# Patient Record
Sex: FEMALE | Employment: UNEMPLOYED | URBAN - METROPOLITAN AREA
[De-identification: names, ages, dates, MRNs, and addresses within clinical notes are randomized per-mention and may not be internally consistent; named-entity substitution may affect disease eponyms.]

---

## 2022-06-04 ENCOUNTER — TELEPHONE ENCOUNTER (OUTPATIENT)
Dept: URBAN - METROPOLITAN AREA CLINIC 68 | Facility: CLINIC | Age: 72
End: 2022-06-04

## 2022-06-04 RX ORDER — ANASTROZOLE 1 MG/1
ARIMIDEX( 1MG ORAL  DAILY ) INACTIVE -HX ENTRY TABLET ORAL DAILY
OUTPATIENT
Start: 2019-06-18

## 2022-06-04 RX ORDER — BIOTIN 5 MG
BIOTIN 5000( 5MG ORAL  DAILY ) INACTIVE -HX ENTRY CAPSULE ORAL DAILY
OUTPATIENT
Start: 2019-06-18

## 2022-06-04 RX ORDER — SODIUM SULFATE, POTASSIUM SULFATE, MAGNESIUM SULFATE 17.5; 3.13; 1.6 G/ML; G/ML; G/ML
SOLUTION, CONCENTRATE ORAL AS DIRECTED
Qty: 1 | Refills: 0 | OUTPATIENT
Start: 2019-06-18 | End: 2019-06-19

## 2022-06-04 RX ORDER — POLYETHYLENE GLYCOL 3350, SODIUM SULFATE, SODIUM CHLORIDE, POTASSIUM CHLORIDE, ASCORBIC ACID, SODIUM ASCORBATE 7.5-2.691G
KIT ORAL AS DIRECTED
Qty: 1 | Refills: 0 | OUTPATIENT
Start: 2012-11-29 | End: 2012-11-30

## 2022-06-04 RX ORDER — ASPIRIN 81 MG
DHA COMPLETE( 200MG ORAL  DAILY ) INACTIVE -HX ENTRY TABLET,CHEWABLE ORAL DAILY
OUTPATIENT
Start: 2019-06-18

## 2022-06-05 ENCOUNTER — TELEPHONE ENCOUNTER (OUTPATIENT)
Dept: URBAN - METROPOLITAN AREA CLINIC 68 | Facility: CLINIC | Age: 72
End: 2022-06-05

## 2022-06-05 RX ORDER — METOPROLOL TARTRATE 100 MG/1
METOPROLOL TARTRATE( 100MG ORAL 1 DAILY ) ACTIVE -HX ENTRY TABLET, FILM COATED ORAL DAILY
Status: ACTIVE | COMMUNITY
Start: 2019-06-18

## 2022-06-05 RX ORDER — LEVOTHYROXINE SODIUM 0.05 MG/1
LEVOTHYROXINE SODIUM( 50MCG ORAL 1 DAILY ) ACTIVE -HX ENTRY TABLET ORAL DAILY
Status: ACTIVE | COMMUNITY
Start: 2019-06-18

## 2022-06-25 ENCOUNTER — TELEPHONE ENCOUNTER (OUTPATIENT)
Age: 72
End: 2022-06-25

## 2022-06-25 RX ORDER — POLYETHYLENE GLYCOL 3350, SODIUM SULFATE, SODIUM CHLORIDE, POTASSIUM CHLORIDE, ASCORBIC ACID, SODIUM ASCORBATE 7.5-2.691G
KIT ORAL AS DIRECTED
Qty: 1 | Refills: 0 | OUTPATIENT
Start: 2012-11-29 | End: 2012-11-30

## 2022-06-25 RX ORDER — ASPIRIN 81 MG
DHA COMPLETE( 200MG ORAL  DAILY ) INACTIVE -HX ENTRY TABLET,CHEWABLE ORAL DAILY
OUTPATIENT
Start: 2019-06-18

## 2022-06-25 RX ORDER — SODIUM SULFATE, POTASSIUM SULFATE, MAGNESIUM SULFATE 17.5; 3.13; 1.6 G/ML; G/ML; G/ML
SOLUTION, CONCENTRATE ORAL AS DIRECTED
Qty: 1 | Refills: 0 | OUTPATIENT
Start: 2019-06-18 | End: 2019-06-19

## 2022-06-25 RX ORDER — ANASTROZOLE 1 MG/1
ARIMIDEX( 1MG ORAL  DAILY ) INACTIVE -HX ENTRY TABLET ORAL DAILY
OUTPATIENT
Start: 2019-06-18

## 2022-06-25 RX ORDER — ELECTROLYTES/DEXTROSE
VITAMIN D3( 1000UNIT ORAL  DAILY ) INACTIVE -HX ENTRY SOLUTION, ORAL ORAL DAILY
OUTPATIENT
Start: 2019-06-18

## 2022-06-26 ENCOUNTER — TELEPHONE ENCOUNTER (OUTPATIENT)
Age: 72
End: 2022-06-26

## 2022-06-26 RX ORDER — ASPIRIN 81 MG/1
LOW-DOSE ASPIRIN( 81MG ORAL  DAILY ) ACTIVE -HX ENTRY TABLET, COATED ORAL DAILY
Status: ACTIVE | COMMUNITY
Start: 2019-06-18

## 2022-06-26 RX ORDER — METOPROLOL TARTRATE 100 MG/1
METOPROLOL TARTRATE( 100MG ORAL 1 DAILY ) ACTIVE -HX ENTRY TABLET, FILM COATED ORAL DAILY
Status: ACTIVE | COMMUNITY
Start: 2019-06-18

## 2022-06-26 RX ORDER — LEVOTHYROXINE SODIUM 50 UG/1
LEVOTHYROXINE SODIUM( 50MCG ORAL 1 DAILY ) ACTIVE -HX ENTRY TABLET ORAL DAILY
Status: ACTIVE | COMMUNITY
Start: 2019-06-18

## 2023-08-29 LAB
APPEARANCE, URINE: CLEAR
BILIRUBIN, URINE: NEGATIVE
BLOOD, URINE: NEGATIVE
CALCIUM (MG/DL) IN SER/PLAS: 9.6 MG/DL (ref 8.6–10.6)
CHOLESTEROL (MG/DL) IN SER/PLAS: 169 MG/DL (ref 0–199)
CHOLESTEROL IN HDL (MG/DL) IN SER/PLAS: 51.3 MG/DL
CHOLESTEROL/HDL RATIO: 3.3
COLOR, URINE: YELLOW
GLUCOSE, URINE: NEGATIVE MG/DL
HYALINE CASTS, URINE: ABNORMAL /LPF
KETONES, URINE: NEGATIVE MG/DL
LDL: 91 MG/DL (ref 0–99)
LEUKOCYTE ESTERASE, URINE: NEGATIVE
MAGNESIUM (MG/DL) IN SER/PLAS: 2.12 MG/DL (ref 1.6–2.4)
MUCUS, URINE: ABNORMAL /LPF
NITRITE, URINE: NEGATIVE
PH, URINE: 5 (ref 5–8)
PROTEIN, URINE: NEGATIVE MG/DL
RBC, URINE: 1 /HPF (ref 0–5)
SPECIFIC GRAVITY, URINE: 1.02 (ref 1–1.03)
SQUAMOUS EPITHELIAL CELLS, URINE: <1 /HPF
TRIGLYCERIDE (MG/DL) IN SER/PLAS: 135 MG/DL (ref 0–149)
UROBILINOGEN, URINE: <2 MG/DL (ref 0–1.9)
VLDL: 27 MG/DL (ref 0–40)
WBC, URINE: 1 /HPF (ref 0–5)

## 2023-10-04 ENCOUNTER — TELEPHONE (OUTPATIENT)
Dept: ADMISSION | Facility: HOSPITAL | Age: 73
End: 2023-10-04
Payer: MEDICARE

## 2023-10-04 NOTE — TELEPHONE ENCOUNTER
Pt takes Ibrance; wants to know when Dr. Garber recommends she get her Flu vaccine this year.     1650: Let pt know per Dr Garber she should get the Flu vaccine this month on her off-week of Ibrance. She verbalized understanding. No further questions/concerns.

## 2023-10-24 ENCOUNTER — NURSE TRIAGE (OUTPATIENT)
Dept: ADMISSION | Facility: HOSPITAL | Age: 73
End: 2023-10-24
Payer: MEDICARE

## 2023-10-24 RX ORDER — DULOXETIN HYDROCHLORIDE 60 MG/1
60 CAPSULE, DELAYED RELEASE ORAL DAILY
COMMUNITY
Start: 2023-07-26

## 2023-10-24 RX ORDER — DULOXETIN HYDROCHLORIDE 30 MG/1
30 CAPSULE, DELAYED RELEASE ORAL DAILY
Qty: 90 CAPSULE | Status: CANCELLED | OUTPATIENT
Start: 2023-10-24

## 2023-10-24 RX ORDER — DULOXETIN HYDROCHLORIDE 60 MG/1
60 CAPSULE, DELAYED RELEASE ORAL DAILY
Qty: 90 CAPSULE | Status: CANCELLED | OUTPATIENT
Start: 2023-10-24

## 2023-10-24 RX ORDER — DULOXETIN HYDROCHLORIDE 30 MG/1
30 CAPSULE, DELAYED RELEASE ORAL DAILY
COMMUNITY
Start: 2023-07-26

## 2023-10-24 NOTE — TELEPHONE ENCOUNTER
Called patient to discuss refills. She states she is still in [place] but will be back in [place] to see Dr. Garber on 11/10 unfortunately Diana is at [facility] but this appt is at [facility] so unable to align. She reports she is doing relatively well except for some n/t in her hand. She is using docusate which controls her constipation well. She was agreeable to calling her PCP that is in [place] to fill this since Diana is unable to prescribe across state lines. Dr. Ye Galvan. We will set up a FUV in February when she said she will be back again for another oncology visit.

## 2023-11-02 ENCOUNTER — TELEPHONE (OUTPATIENT)
Dept: ADMISSION | Facility: HOSPITAL | Age: 73
End: 2023-11-02
Payer: MEDICARE

## 2023-11-02 DIAGNOSIS — D49.3 BREAST NEOPLASM: Primary | ICD-10-CM

## 2023-11-02 NOTE — TELEPHONE ENCOUNTER
Gracy called triage line and wanted to get and appointment to see Diana Farias to go over medications and other questions. I will put in a request with scheduling now.

## 2023-11-08 PROBLEM — C78.02: Status: ACTIVE | Noted: 2023-11-08

## 2023-11-08 PROBLEM — C50.919 MALIGNANT NEOPLASM OF UNSPECIFIED SITE OF UNSPECIFIED FEMALE BREAST (MULTI): Status: ACTIVE | Noted: 2023-11-08

## 2023-11-08 PROBLEM — C50.912: Status: ACTIVE | Noted: 2023-11-08

## 2023-11-08 RX ORDER — DIPHENHYDRAMINE HYDROCHLORIDE 50 MG/ML
50 INJECTION INTRAMUSCULAR; INTRAVENOUS AS NEEDED
Status: CANCELLED | OUTPATIENT
Start: 2023-11-10

## 2023-11-08 RX ORDER — EPINEPHRINE 0.3 MG/.3ML
0.3 INJECTION SUBCUTANEOUS EVERY 5 MIN PRN
Status: CANCELLED | OUTPATIENT
Start: 2023-11-10

## 2023-11-08 RX ORDER — FAMOTIDINE 10 MG/ML
20 INJECTION INTRAVENOUS ONCE AS NEEDED
Status: CANCELLED | OUTPATIENT
Start: 2023-11-10

## 2023-11-08 RX ORDER — ZOLEDRONIC ACID 0.04 MG/ML
4 INJECTION, SOLUTION INTRAVENOUS ONCE
Status: CANCELLED | OUTPATIENT
Start: 2023-11-10

## 2023-11-08 RX ORDER — ALBUTEROL SULFATE 0.83 MG/ML
3 SOLUTION RESPIRATORY (INHALATION) AS NEEDED
Status: CANCELLED | OUTPATIENT
Start: 2023-11-10

## 2023-11-08 NOTE — PROGRESS NOTES
Patient ID: Gracy Espino is a 73 y.o. female.    The patient presents to clinic today for her history of breast cancer.     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  ER >95% KY 10% Her2-negative (0 IHC).  Sites of Disease: LN's, pleura, bone.  NGS: PIK3CA     -2007: Right T1c N1a ER/KY+ Her2- breast cancer.  -TAC x6 cycles.  -RT completed 3/2008.  -Completed 5 years of anastrozole 4/2013. Two more years, stopped 6/2015.  -1/7/2023: Presented to ER with dyspnea and right lymphedema. CTA showed large right-sided pleural effusion, small left pleural effusion, nodular opacities in MARITZA and lingual, right axillary soft tissue lesion (3.7 x 4.1 cm) with nodular opacities in  subcutaneous tissues of right chest wall.  -1/13/23: PET/CT showed hypermetabolic disease in multiple supraclavicular, axillary, mediastinal, hilar, periaortic LN’s. Osseous lesions in axial and appendicular skeleton, pleural nodularities in right hemithorax, focus of activity in right  posterior triceps muscle.  -1/13/23: RUE MRI confirmed right masslike axillary LAD with mass effect on the neurovascular bundle without occlusion.  -1/20/23: Right axillary biopsy confirmed metastatic IDC, ER >95% KY 10% Her2-negative (0 by IHC). NGS testing showed PIK3CA  mutation.  -1/2023: Letrozole initiated.  -2/7/23: Palbociclib initiated.  -5/11/23: Zoledronic acid initiated (q12 weeks).       History of Present Illness (HPI)/Interval History:  Ms. Espino presents for presents today for follow-up, treatment and surveillance. Today she is in her 3rd week of palbociclib (3 days left). Continues to tolerate treatment well. Her biggest complaint  is ongoing lymphedema and cramping in right hand and feet. She is a a small lymphedema sleeve now. Taking duloxetine 90 mg for neuropathy. She does follow with supportive oncology.     She fell a week and a half ago, required 3 stiches. Tripped over a cord. She is using arnicare for bruising.      She endorses her breathing is stable to improved. Denies nausea, vomiting, constipation, diarrhea, mouth sores, or new bone pain.     Review of Systems:  14-point ROS otherwise negative, as per HPI.    No past medical history on file.    No past surgical history on file.    Social History     Socioeconomic History    Marital status:      Spouse name: Not on file    Number of children: Not on file    Years of education: Not on file    Highest education level: Not on file   Occupational History    Not on file   Tobacco Use    Smoking status: Not on file    Smokeless tobacco: Not on file   Substance and Sexual Activity    Alcohol use: Not on file    Drug use: Not on file    Sexual activity: Not on file   Other Topics Concern    Not on file   Social History Narrative    Not on file     Social Determinants of Health     Financial Resource Strain: Not on file   Food Insecurity: Not on file   Transportation Needs: Not on file   Physical Activity: Not on file   Stress: Not on file   Social Connections: Not on file   Intimate Partner Violence: Not on file   Housing Stability: Not on file       Allergies   Allergen Reactions    Penicillins Unknown         Current Outpatient Medications:     docusate sodium (Colace) 100 mg capsule, Take 2 capsules (200 mg) by mouth once daily at bedtime., Disp: , Rfl:     DULoxetine (Cymbalta) 30 mg DR capsule, Take 1 capsule (30 mg) by mouth once daily., Disp: , Rfl:     DULoxetine (Cymbalta) 60 mg DR capsule, Take 1 capsule (60 mg) by mouth once daily., Disp: , Rfl:     levothyroxine (Synthroid, Levoxyl) 50 mcg tablet, Take by mouth., Disp: , Rfl:     metoprolol succinate XL (Toprol-XL) 100  "mg 24 hr tablet, Take 1 tablet (100 mg) by mouth once daily., Disp: , Rfl:     spironolacton-hydrochlorothiaz (Aldactazide) 25-25 mg tablet, , Disp: , Rfl:     traMADol (Ultram) 50 mg tablet, , Disp: , Rfl:     ALPRAZolam (Xanax) 0.25 mg tablet, TAKE 1 TABLET BY MOUTH UP TO EVERY 8 HOURS AS NEEDED FOR ANXIETY, Disp: , Rfl:     amLODIPine (Norvasc) 2.5 mg tablet, Take by mouth., Disp: , Rfl:     cholecalciferol (Vitamin D3) 50 MCG (2000 UT) tablet, Take 1 tablet (50 mcg) by mouth once daily., Disp: , Rfl:     clindamycin (Cleocin) 300 mg capsule, Take by mouth., Disp: , Rfl:     furosemide (Lasix) 20 mg tablet, Take 1 tablet (20 mg) by mouth once daily., Disp: , Rfl:     letrozole (Femara) 2.5 mg tablet, Take 1 tablet (2.5 mg total) by mouth once daily., Disp: 90 tablet, Rfl: 1    LORazepam (Ativan) 0.5 mg tablet, TAKE 1 TABLET BY MOUTH 45 MINUTES PRIOR TO MRI. DO NOT DRIVE, Disp: , Rfl:     neomycin-polymyxin-dexAMETHasone (Maxitrol) 3.5mg/mL-10,000 unit/mL-0.1 % ophthalmic suspension, , Disp: , Rfl:     omega 3-dha-epa-fish oil (Fish OiL) 1,000 mg (120 mg-180 mg) capsule, Take by mouth., Disp: , Rfl:     ondansetron (Zofran) 8 mg tablet, , Disp: , Rfl:     potassium chloride CR 20 mEq ER tablet, Take 1 tablet (20 mEq) by mouth once daily., Disp: , Rfl:   No current facility-administered medications for this visit.    Facility-Administered Medications Ordered in Other Visits:     alteplase (Cathflo Activase) injection 2 mg, 2 mg, intra-catheter, PRN, EAN Mcfarlane-BERNARDO    heparin flush 10 unit/mL syringe 50 Units, 50 Units, intra-catheter, PRN, EAN Mcfarlane-BERNARDO    heparin flush 100 unit/mL injection 500 Units, 500 Units, intra-catheter, PRN, EAN Mcfarlane-BERNARDO     Objective    BSA: 1.9 meters squared  /67   Pulse 71   Temp 36 °C (96.8 °F) (Skin)   Resp 20   Ht 1.767 m (5' 9.57\")   Wt 73.2 kg (161 lb 6 oz)   SpO2 98%   BMI 23.44 kg/m²     Performance Status:  The ECOG performance scale " today is ECO- Restricted in physically strenuous activity.  Carries out light duty.    Physical Exam  Vitals reviewed.   Constitutional:       General: She is awake. She is not in acute distress.     Appearance: Normal appearance. She is not ill-appearing.   HENT:      Mouth/Throat:      Pharynx: Oropharynx is clear. No oropharyngeal exudate.   Eyes:      General: No scleral icterus.     Conjunctiva/sclera: Conjunctivae normal.   Neck:      Trachea: Trachea and phonation normal. No tracheal tenderness.   Cardiovascular:      Rate and Rhythm: Normal rate and regular rhythm.      Heart sounds: No murmur heard.     No friction rub. No gallop.   Pulmonary:      Effort: Pulmonary effort is normal. No respiratory distress.      Breath sounds: Normal breath sounds. No stridor. No wheezing, rhonchi or rales.   Chest:      Chest wall: No mass, lacerations, deformity or tenderness.   Breasts:     Right: No swelling, mass, nipple discharge, skin change or tenderness.      Left: No swelling, mass, nipple discharge, skin change or tenderness.      Comments: S.p right mastectomy with reconstruction  Abdominal:      General: Abdomen is flat. There is no distension.      Palpations: Abdomen is soft. There is no mass.      Tenderness: There is no abdominal tenderness.   Musculoskeletal:      Right shoulder: Decreased range of motion.      Cervical back: No tenderness.      Thoracic back: No tenderness.      Lumbar back: No tenderness.      Comments: Right arm lymphedema, with sleeve on    Lymphadenopathy:      Cervical: No cervical adenopathy.      Upper Body:      Right upper body: No supraclavicular, axillary or pectoral adenopathy.      Left upper body: No supraclavicular, axillary or pectoral adenopathy.   Skin:     General: Skin is warm and dry.      Coloration: Skin is not jaundiced.      Findings: No lesion or rash.   Neurological:      General: No focal deficit present.      Mental Status: She is alert and oriented to  person, place, and time.      Motor: No weakness.      Gait: Gait normal.   Psychiatric:         Mood and Affect: Mood normal.         Thought Content: Thought content normal.         Judgment: Judgment normal.         Laboratory Data:  Lab Results   Component Value Date    WBC 3.4 (L) 11/10/2023    HGB 12.6 11/10/2023    HCT 37.0 11/10/2023     (H) 11/10/2023     (L) 11/10/2023       Chemistry    Lab Results   Component Value Date/Time     11/10/2023 0930    K 4.3 11/10/2023 0930     11/10/2023 0930    CO2 29 11/10/2023 0930    BUN 18 11/10/2023 0930    CREATININE 0.81 11/10/2023 0930    Lab Results   Component Value Date/Time    CALCIUM 9.4 11/10/2023 0930    ALKPHOS 76 11/10/2023 0930    AST 17 11/10/2023 0930    ALT 17 11/10/2023 0930    BILITOT 0.5 11/10/2023 0930             Radiology:  CT chest abdomen pelvis w IV contrast  Narrative: Interpreted By:  JOANIE CAPUTO MD and RAMSEY SOLANO MD  MRN: [id number]  Patient Name: CHIO VERONICA     STUDY:  CT CHEST ABDOMEN PELVIS W IV CONTRAST;  7/28/2023 11:03 am     INDICATION:  Breast cancer restaging, Pt. is unable to bring her rt. arm above her  head.     Clinical notes: Patient has a history of ER TN positive HER2 negative  breast cancer which was diagnosed originally in 2007, underwent  chemoradiation and hormonal therapy until 2015. Presented in January 2023 with right lymphedema, was found to have a large right pleural  effusion, nodular opacities throughout the lungs  Supraclavicular lymphadenopathy common subcutaneous nodules. Biopsy  of right axillary lymph node confirmed metastatic IDC. Now with  metastasis to the bone. Started on letrozole and palbociclib.     COMPARISON:  Whole-body bone scan 07/28/2023, CT chest abdomen pelvis 05/10/2023,  PET-CT 01/13/2023, chest CT 08/07/2008, CT abdomen pelvis 09/27/2008,  chest CTA 01/07/2020     ACCESSION NUMBER(S):  [id number]     ORDERING CLINICIAN:  ABIMAEL HORVATH      TECHNIQUE:  Contiguous axial images of the chest , abdomen, and pelvis were  obtained  75 milliliter of OMNIPAQUE 350.  Coronal and sagittal  reformatted images were reconstructed from the axial data.     FINDINGS:  CT CHEST:     MEDIASTINUM AND LYMPH NODES: No evidence of left axillary  lymphadenopathy. Compared to prior CT on 05/10/2023, there has been  no significant interval change in the size multiple subcentimeter  mediastinal lymph nodes, largest measuring up to 0.7 centimeters in  station 4L. There is a 0.8 centimeter right hilar lymph node,  unchanged from prior. No new lymph nodes identified. The soft tissue  thickening in the anterior mediastinum anterior to the right  subclavian vein is also unchanged from prior. There was evidence of  associated hypermetabolic activity on prior PET-CT on 01/13/2023.     VESSELS: Normal caliber aorta. Mild aortic atherosclerosis.     HEART:Normal size.Mild coronary artery calcifications. No significant  pericardial effusion.     LUNG, AIRWAYS, PLEURA: The trachea and central bronchi are patent.  Compared to prior examination on 05/10/2023, there has been slight  interval decrease in a moderate right pleural effusion with  associated compressive atelectasis. No left-sided pleural effusion.  Subpleural reticular opacities in the right upper lobe are similar to  prior and are most consistent with post radiation changes. A focal  opacity is also noted in the apical segment of the right upper lobe  (series 202 image 59), which is unchanged dating back to 05/10/2023.  Additional linear opacity in the left lower lobe extending to the  posteromedial pleural surface is again noted, overall decreased  compared to prior CT on 01/07/2023, likely representing residual  atelectasis.     CHEST WALL SOFT TISSUES:There is a 2.8 x 6.2 x 5.3 cm ill-defined  soft tissue mass in the right chest wall at the site of biopsy-proven  metastatic disease, not significantly changed compared to  prior  examination on 05/10/2023. There is an additional 1.1 cm soft tissue  nodule in the right chest wall (series 201, image 71), unchanged from  prior.  Postsurgical changes of bilateral mastectomy with subpectoral  implants. No evidence of soft tissue nodularity is identified at the  postsurgical bed.  Subcutaneous edema involving the right upper extremity soft tissues  is unchanged to minimally decreased from prior, consistent with known  history of lymphedema.     OSSEOUS STRUCTURES:No acute osseous abnormality. Multiple bilateral  sclerotic lesions throughout the ribs are unchanged compared to prior  examination on 05/10/2023. These demonstrated hypermetabolic activity  on prior PET-CT on 01/13/2023, most consistent with osseous  metastases. These lesions are annotated on PACS series 201.  Postsurgical changes of right total shoulder arthroplasty. Atrophy of  the right rotator cuff muscles is suggestive of chronic rotator cuff  tear.        CT ABDOMEN/PELVIS:        ABDOMINAL WALL: Small fat containing periumbilical hernia.     LIVER: Numerous hypodense lesions throughout the liver are again  noted, unchanged in size and distribution compared to prior CT on  05/10/2023, too small to characterize, but likely representing simple  cysts.  There is a peripherally calcified 1.4 centimeter cyst with simple  fluid attenuation arising from hepatic segment 6, significantly  decreased in size compared to prior examination on 02/04/2008, likely  representing a peripherally calcified simple cysts.     BILE DUCTS: Mild prominence of the CBD, likely physiologic post  cholecystectomy. No intrahepatic biliary dilatation.     GALLBLADDER: Surgically absent.     SPLEEN: Stable and normal in size. No focal lesions.     PANCREAS: No significant abnormality.     ADRENALS: No significant abnormality.     KIDNEYS, URETERS, BLADDER: Bilateral simple renal cysts again noted.  There is a 1.7 centimeter exophytic cyst arising from the  interpolar  region of the left kidney with density of 27 Hounsfield units. This  is unchanged compared to prior examination on 01/07/2023 and  previously demonstrated fluid density. Likely represents a cyst  containing hemorrhagic contents. No nephroureterolithiasis or  hydroureteronephrosis. Evaluation of the bladder is limited due to  streak artifact from right total hip arthroplasty.     REPRODUCTIVE ORGANS: Evaluation of the pelvis is limited due to  streak artifact. The uterus is present. No adnexal lesions identified.     VESSELS: Severe vascular calcified and noncalcified noted involving  the aorta and its branches. No aneurysm.  Narrow angle between the SMA branch and the aorta, with mild post  stenotic dilation of the left renal vein, a finding which can be seen  in the setting of nutcracker syndrome. There is also evidence of  poststenotic dilation of the left gonadal vein.     RETROPERITONEUM/LYMPH NODES: No evidence of abdominopelvic  lymphadenopathy by CT criteria.     BOWEL/MESENTERY/PERITONEUM: No significant abnormalities involving  the stomach.Severe colonic diverticulosis without evidence of acute  diverticulitis. The ascending colon is fluid-filled and mildly  hyperemic with suggestion of mild wall thickening (series 206, image  35). Otherwise, no focal bowel wall thickening is identified. The  appendix is normal.     No ascites, free air, or fluid collection.     MUSCULOSKELETAL: No acute osseous abnormality. Diffuse  demineralization of the osseous structures. Multilevel anterior  bridging osteophytes throughout the lower thoracic spine, which can  be seen the setting of diffuse idiopathic skeletal hyperostosis  (DISH).  Numerous sclerotic lesions are noted throughout the sacrum, bilateral  iliac bones, and the left ischium previously demonstrating  hypermetabolic activity on PET-CT dated 01/13/2023, most consistent  with osseous metastatic disease. No new lesions identified. These  lesions are  annotated on PACS series 201. A predominantly lucent  lesion with sclerotic borders is also noted in the right iliac bone  adjacent to the SI joint which is similar to prior examination  (series 201, image 166).  Post laminectomy changes are noted at L3-4. Moderate degenerative  changes of the lumbar spine noted. Postsurgical changes of right  total hip arthroplasty noted without evidence of hardware failure.     Impression: Breast cancer restaging scan.  CHEST  1. Compared to prior examination on 05/10/2023, there has been no  significant interval change in the size of ill-defined soft tissue  lesion in the right chest wall at the site of biopsy-proven  malignancy involving fibrous tissue in the right axilla. Additional  small soft tissue nodule within the right chest wall is also  unchanged from prior.  2. Slight interval decrease in a moderate right pleural effusion with  associated compressive atelectasis in the right lung. A focus of  subsolid opacity is noted in the apical segment of the right upper  lobe which is overall similar to prior examination and may represent  persistent atelectasis. However, continued attention on follow-up  examinations is recommended to ensure resolution.  3. There is no significant interval change in the size of multiple  subcentimeter mediastinal and hilar lymph nodes previously  demonstrating hypermetabolic activity. No evidence of new or  worsening lymphadenopathy identified.  4. No significant interval change in the numerous sclerotic lesions  involving there is bilaterally at the site of known osseous  metastases.  1. Compared to prior examination on 05/10/2023, there has been no  significant interval change in the osseous metastatic foci  predominantly involving the pelvic bones, previously demonstrating  hypermetabolic activity on prior PET-CT. Otherwise, no definitive  evidence of metastatic disease identified in the abdomen or pelvis.  2. The ascending colon is  fluid-filled with suggestion of mild  hyperemia and mild bowel wall thickening at the hepatic flexure. The  findings are nonspecific and may be secondary to bowel wall  underdistention, however, underlying colitis can not be excluded.  Recommend correlation with clinical symptoms.  3.  Additional findings as above.     I personally reviewed the images/study and I agree with the findings  as stated. This study was interpreted at [facility], [place], [place].       No results found for this or any previous visit from the past 365 days.          Assessment/Plan:    Gracy Espino is a 73 y.o. female with a history of metastatic breast cancer, who presents today follow-up evaluation.    Assess/Plan SmartLinks:   Problem List Items Addressed This Visit             ICD-10-CM    Malignant neoplasm of unspecified site of unspecified female breast (CMS/HCC) C50.919    Relevant Medications    letrozole (Femara) 2.5 mg tablet    Other Relevant Orders    CBC and Auto Differential (Completed)    Comprehensive Metabolic Panel (Completed)    Cancer Antigen 27-29 (Completed)    NM bone whole body    Clinic Appointment Request ABIMAEL HORVATH    CBC and Auto Differential    Comprehensive metabolic panel    Cancer Antigen 27-29    Magnesium    Phosphorus    CT chest abdomen pelvis w IV contrast    Carcinoma of left breast metastatic to lung (CMS/HCC) - Primary C50.912, C78.02     - Continue Ibrance + letrozole (will work on [employer] mark renewal) Letrozole refill sent  - Next re-staging scans will be for Feb 2024         Relevant Orders    NM bone whole body    CT chest abdomen pelvis w IV contrast    Decreased ROM of right shoulder M25.611    Carcinoma of breast metastatic to bone (CMS/HCC) C50.919, C79.51     - Continue zometa q 12 weeks, dose today   - Educated on Claritin for flu-like symptoms          Peripheral neuropathy G62.9    Lymphedema of right arm I89.0     Compression of neurovascular  structures without overt brachial plexopathy.  - Systemic therapy, as above. Markedly improved.  - Continue compression sleeve and home massage   - For muscle cramping she can try heat / massage of the hands and feet. Electrolytes WNL              Disposition.  - RTC 3 months with scan review and Dr. Sloan Garber MD follow up  - She was given our contact information and instructed to call with concerns/questions in the interim.    Orders Placed This Encounter   Procedures    NM bone whole body     Standing Status:   Future     Standing Expiration Date:   11/10/2024     Order Specific Question:   Reason for exam:     Answer:   metastatic breast cancer     Order Specific Question:   Radiologist to Determine Optimal Study     Answer:   Yes     Order Specific Question:   Release result to Explore.To [employer] Pages[employer]     Answer:   Immediate [1]     Order Specific Question:   Is this exam part of a Research Study? If Yes, link this order to the research study     Answer:   No    CT chest abdomen pelvis w IV contrast     Standing Status:   Future     Standing Expiration Date:   11/10/2024     Order Specific Question:   Reason for exam:     Answer:   metastatic breast cancer     Order Specific Question:   Radiologist to Determine Optimal Study     Answer:   Yes     Order Specific Question:   Release result to Explore.To [employer]     Answer:   Immediate [1]     Order Specific Question:   Is this exam part of a Research Study? If Yes, link this order to the research study     Answer:   No    CBC and Auto Differential     Standing Status:   Future     Number of Occurrences:   1     Standing Expiration Date:   11/2/2024    Comprehensive Metabolic Panel     Standing Status:   Future     Number of Occurrences:   1     Standing Expiration Date:   11/2/2024     Order Specific Question:   Release result to MyChart     Answer:   Immediate [1]    Cancer Antigen 27-29     Standing Status:   Future     Number of Occurrences:   1     Standing Expiration Date:    11/2/2024     Order Specific Question:   Release result to MyChart     Answer:   Immediate [1]    CBC and Auto Differential     Standing Status:   Future     Standing Expiration Date:   11/10/2024     Order Specific Question:   Release result to MyChart     Answer:   Immediate [1]    Comprehensive metabolic panel     Standing Status:   Future     Standing Expiration Date:   11/10/2024     Order Specific Question:   Release result to MyChart     Answer:   Immediate [1]    Cancer Antigen 27-29     Standing Status:   Future     Standing Expiration Date:   11/10/2024     Order Specific Question:   Release result to MyChart     Answer:   Immediate    Magnesium     Standing Status:   Future     Standing Expiration Date:   11/10/2024     Order Specific Question:   Release result to MyChart     Answer:   Immediate [1]    Phosphorus     Standing Status:   Future     Standing Expiration Date:   11/10/2024     Order Specific Question:   Release result to MyChart     Answer:   Immediate [1]             Allyson Lozano PA-C  Hematology and Medical Oncology  [facility]

## 2023-11-09 PROBLEM — M48.061 LUMBAR STENOSIS: Status: ACTIVE | Noted: 2023-11-09

## 2023-11-09 PROBLEM — M25.611 DECREASED ROM OF RIGHT SHOULDER: Status: ACTIVE | Noted: 2023-11-09

## 2023-11-09 PROBLEM — G62.9 PERIPHERAL NEUROPATHY: Status: ACTIVE | Noted: 2023-11-09

## 2023-11-09 PROBLEM — F41.9 ANXIETY DISORDER: Status: ACTIVE | Noted: 2023-11-09

## 2023-11-09 PROBLEM — M16.11 ARTHRITIS OF RIGHT HIP: Status: ACTIVE | Noted: 2023-11-09

## 2023-11-09 PROBLEM — K59.00 CONSTIPATION: Status: ACTIVE | Noted: 2023-11-09

## 2023-11-09 PROBLEM — Z85.3 HISTORY OF BREAST CANCER: Status: ACTIVE | Noted: 2023-11-09

## 2023-11-09 PROBLEM — G47.00 INSOMNIA: Status: ACTIVE | Noted: 2023-11-09

## 2023-11-09 PROBLEM — R22.30 AXILLARY MASS: Status: ACTIVE | Noted: 2023-11-09

## 2023-11-09 PROBLEM — C50.919 CARCINOMA OF BREAST METASTATIC TO BONE (MULTI): Status: ACTIVE | Noted: 2023-11-09

## 2023-11-09 PROBLEM — M54.16 LUMBAR RADICULOPATHY: Status: ACTIVE | Noted: 2023-11-09

## 2023-11-09 PROBLEM — H00.014 HORDEOLUM EXTERNUM OF LEFT UPPER EYELID: Status: ACTIVE | Noted: 2023-11-09

## 2023-11-09 PROBLEM — M12.811 ROTATOR CUFF ARTHROPATHY OF RIGHT SHOULDER: Status: ACTIVE | Noted: 2023-11-09

## 2023-11-09 PROBLEM — H57.89 SWOLLEN EYE: Status: ACTIVE | Noted: 2023-11-09

## 2023-11-09 PROBLEM — L03.113 CELLULITIS OF RIGHT UPPER ARM: Status: ACTIVE | Noted: 2023-11-09

## 2023-11-09 PROBLEM — R06.02 SHORTNESS OF BREATH ON EXERTION: Status: ACTIVE | Noted: 2023-11-09

## 2023-11-09 PROBLEM — Z96.611 STATUS POST REPLACEMENT OF RIGHT SHOULDER JOINT: Status: ACTIVE | Noted: 2023-11-09

## 2023-11-09 PROBLEM — J90 BILATERAL PLEURAL EFFUSION: Status: ACTIVE | Noted: 2023-11-09

## 2023-11-09 PROBLEM — C79.51 CARCINOMA OF BREAST METASTATIC TO BONE (MULTI): Status: ACTIVE | Noted: 2023-11-09

## 2023-11-09 PROBLEM — I89.0 LYMPHEDEMA OF RIGHT ARM: Status: ACTIVE | Noted: 2023-11-09

## 2023-11-09 RX ORDER — GABAPENTIN 100 MG/1
CAPSULE ORAL
COMMUNITY
Start: 2022-11-11 | End: 2023-11-10 | Stop reason: ALTCHOICE

## 2023-11-09 RX ORDER — TRAMADOL HYDROCHLORIDE 50 MG/1
TABLET ORAL
COMMUNITY
Start: 2023-10-09 | End: 2024-05-28 | Stop reason: SDUPTHER

## 2023-11-09 RX ORDER — ALPRAZOLAM 0.25 MG/1
TABLET ORAL
COMMUNITY
Start: 2023-01-16 | End: 2024-02-07 | Stop reason: ALTCHOICE

## 2023-11-09 RX ORDER — NEOMYCIN SULFATE, POLYMYXIN B SULFATE AND DEXAMETHASONE 3.5; 10000; 1 MG/ML; [USP'U]/ML; MG/ML
SUSPENSION/ DROPS OPHTHALMIC
COMMUNITY
Start: 2023-02-17

## 2023-11-09 RX ORDER — CLINDAMYCIN HYDROCHLORIDE 300 MG/1
CAPSULE ORAL
COMMUNITY
Start: 2022-08-05

## 2023-11-09 RX ORDER — LEVOTHYROXINE SODIUM 50 UG/1
TABLET ORAL
COMMUNITY
Start: 2018-11-26

## 2023-11-09 RX ORDER — AMLODIPINE BESYLATE 2.5 MG/1
TABLET ORAL
COMMUNITY
Start: 2019-06-28

## 2023-11-09 RX ORDER — METOPROLOL SUCCINATE 100 MG/1
100 TABLET, EXTENDED RELEASE ORAL DAILY
COMMUNITY

## 2023-11-09 RX ORDER — FUROSEMIDE 20 MG/1
20 TABLET ORAL DAILY
COMMUNITY
Start: 2023-01-26

## 2023-11-09 RX ORDER — POTASSIUM CHLORIDE 1500 MG/1
20 TABLET, EXTENDED RELEASE ORAL DAILY
COMMUNITY
Start: 2023-01-26

## 2023-11-09 RX ORDER — LORAZEPAM 0.5 MG/1
TABLET ORAL
COMMUNITY
Start: 2023-01-10 | End: 2024-02-07 | Stop reason: ALTCHOICE

## 2023-11-09 RX ORDER — DOCUSATE SODIUM 100 MG/1
200 CAPSULE, LIQUID FILLED ORAL NIGHTLY
COMMUNITY
Start: 2023-09-05

## 2023-11-09 RX ORDER — SPIRONOLACTONE AND HYDROCHLOROTHIAZIDE 25; 25 MG/1; MG/1
TABLET ORAL
COMMUNITY
Start: 2023-10-16

## 2023-11-09 RX ORDER — ONDANSETRON HYDROCHLORIDE 8 MG/1
TABLET, FILM COATED ORAL
COMMUNITY
Start: 2023-04-04

## 2023-11-09 RX ORDER — LETROZOLE 2.5 MG/1
2.5 TABLET, FILM COATED ORAL DAILY
COMMUNITY
Start: 2023-07-31 | End: 2023-11-10 | Stop reason: SDUPTHER

## 2023-11-10 ENCOUNTER — LAB (OUTPATIENT)
Dept: LAB | Facility: HOSPITAL | Age: 73
End: 2023-11-10
Payer: MEDICARE

## 2023-11-10 ENCOUNTER — INFUSION (OUTPATIENT)
Dept: HEMATOLOGY/ONCOLOGY | Facility: HOSPITAL | Age: 73
End: 2023-11-10
Payer: MEDICARE

## 2023-11-10 ENCOUNTER — OFFICE VISIT (OUTPATIENT)
Dept: HEMATOLOGY/ONCOLOGY | Facility: HOSPITAL | Age: 73
End: 2023-11-10
Payer: MEDICARE

## 2023-11-10 VITALS
SYSTOLIC BLOOD PRESSURE: 112 MMHG | DIASTOLIC BLOOD PRESSURE: 67 MMHG | TEMPERATURE: 96.8 F | OXYGEN SATURATION: 98 % | RESPIRATION RATE: 20 BRPM | HEART RATE: 71 BPM | HEIGHT: 70 IN | BODY MASS INDEX: 23.1 KG/M2 | WEIGHT: 161.38 LBS

## 2023-11-10 DIAGNOSIS — C50.912 CARCINOMA OF LEFT BREAST METASTATIC TO LUNG (MULTI): Primary | ICD-10-CM

## 2023-11-10 DIAGNOSIS — C50.919 MALIGNANT NEOPLASM OF BREAST IN FEMALE, ESTROGEN RECEPTOR POSITIVE, UNSPECIFIED LATERALITY, UNSPECIFIED SITE OF BREAST (MULTI): ICD-10-CM

## 2023-11-10 DIAGNOSIS — C50.911 CARCINOMA OF RIGHT BREAST METASTATIC TO BONE (MULTI): ICD-10-CM

## 2023-11-10 DIAGNOSIS — C50.912 CARCINOMA OF LEFT BREAST METASTATIC TO LUNG (MULTI): ICD-10-CM

## 2023-11-10 DIAGNOSIS — I89.0 LYMPHEDEMA OF RIGHT ARM: ICD-10-CM

## 2023-11-10 DIAGNOSIS — Z17.0 MALIGNANT NEOPLASM OF BREAST IN FEMALE, ESTROGEN RECEPTOR POSITIVE, UNSPECIFIED LATERALITY, UNSPECIFIED SITE OF BREAST (MULTI): ICD-10-CM

## 2023-11-10 DIAGNOSIS — C78.02 CARCINOMA OF LEFT BREAST METASTATIC TO LUNG (MULTI): Primary | ICD-10-CM

## 2023-11-10 DIAGNOSIS — C79.51 CARCINOMA OF RIGHT BREAST METASTATIC TO BONE (MULTI): ICD-10-CM

## 2023-11-10 DIAGNOSIS — C78.02 CARCINOMA OF LEFT BREAST METASTATIC TO LUNG (MULTI): ICD-10-CM

## 2023-11-10 DIAGNOSIS — C50.911 MALIGNANT NEOPLASM OF RIGHT BREAST IN FEMALE, ESTROGEN RECEPTOR POSITIVE, UNSPECIFIED SITE OF BREAST (MULTI): ICD-10-CM

## 2023-11-10 DIAGNOSIS — M25.611 DECREASED ROM OF RIGHT SHOULDER: ICD-10-CM

## 2023-11-10 DIAGNOSIS — Z17.0 MALIGNANT NEOPLASM OF RIGHT BREAST IN FEMALE, ESTROGEN RECEPTOR POSITIVE, UNSPECIFIED SITE OF BREAST (MULTI): ICD-10-CM

## 2023-11-10 DIAGNOSIS — C50.919 MALIGNANT NEOPLASM OF UNSPECIFIED SITE OF UNSPECIFIED FEMALE BREAST (MULTI): ICD-10-CM

## 2023-11-10 DIAGNOSIS — G62.0 DRUG-INDUCED POLYNEUROPATHY (MULTI): ICD-10-CM

## 2023-11-10 LAB
ALBUMIN SERPL BCP-MCNC: 4.1 G/DL (ref 3.4–5)
ALP SERPL-CCNC: 76 U/L (ref 33–136)
ALT SERPL W P-5'-P-CCNC: 17 U/L (ref 7–45)
ANION GAP SERPL CALC-SCNC: 11 MMOL/L (ref 10–20)
AST SERPL W P-5'-P-CCNC: 17 U/L (ref 9–39)
BASOPHILS # BLD AUTO: 0.01 X10*3/UL (ref 0–0.1)
BASOPHILS NFR BLD AUTO: 0.3 %
BILIRUB SERPL-MCNC: 0.5 MG/DL (ref 0–1.2)
BUN SERPL-MCNC: 18 MG/DL (ref 6–23)
CALCIUM SERPL-MCNC: 9.4 MG/DL (ref 8.6–10.3)
CANCER AG27-29 SERPL-ACNC: 34 U/ML (ref 0–38.6)
CHLORIDE SERPL-SCNC: 101 MMOL/L (ref 98–107)
CO2 SERPL-SCNC: 29 MMOL/L (ref 21–32)
CREAT SERPL-MCNC: 0.81 MG/DL (ref 0.5–1.05)
EOSINOPHIL # BLD AUTO: 0.05 X10*3/UL (ref 0–0.4)
EOSINOPHIL NFR BLD AUTO: 1.5 %
ERYTHROCYTE [DISTWIDTH] IN BLOOD BY AUTOMATED COUNT: 13.5 % (ref 11.5–14.5)
GFR SERPL CREATININE-BSD FRML MDRD: 76 ML/MIN/1.73M*2
GLUCOSE SERPL-MCNC: 123 MG/DL (ref 74–99)
HCT VFR BLD AUTO: 37 % (ref 36–46)
HGB BLD-MCNC: 12.6 G/DL (ref 12–16)
IMM GRANULOCYTES # BLD AUTO: 0.01 X10*3/UL (ref 0–0.5)
IMM GRANULOCYTES NFR BLD AUTO: 0.3 % (ref 0–0.9)
LYMPHOCYTES # BLD AUTO: 1.08 X10*3/UL (ref 0.8–3)
LYMPHOCYTES NFR BLD AUTO: 32 %
MAGNESIUM SERPL-MCNC: 1.85 MG/DL (ref 1.6–2.4)
MCH RBC QN AUTO: 36.1 PG (ref 26–34)
MCHC RBC AUTO-ENTMCNC: 34.1 G/DL (ref 32–36)
MCV RBC AUTO: 106 FL (ref 80–100)
MONOCYTES # BLD AUTO: 0.24 X10*3/UL (ref 0.05–0.8)
MONOCYTES NFR BLD AUTO: 7.1 %
NEUTROPHILS # BLD AUTO: 1.99 X10*3/UL (ref 1.6–5.5)
NEUTROPHILS NFR BLD AUTO: 58.8 %
NRBC BLD-RTO: 0 /100 WBCS (ref 0–0)
PHOSPHATE SERPL-MCNC: 3.2 MG/DL (ref 2.5–4.9)
PLATELET # BLD AUTO: 145 X10*3/UL (ref 150–450)
POTASSIUM SERPL-SCNC: 4.3 MMOL/L (ref 3.5–5.3)
PROT SERPL-MCNC: 8.1 G/DL (ref 6.4–8.2)
RBC # BLD AUTO: 3.49 X10*6/UL (ref 4–5.2)
SODIUM SERPL-SCNC: 137 MMOL/L (ref 136–145)
WBC # BLD AUTO: 3.4 X10*3/UL (ref 4.4–11.3)

## 2023-11-10 PROCEDURE — 84100 ASSAY OF PHOSPHORUS: CPT

## 2023-11-10 PROCEDURE — 85025 COMPLETE CBC W/AUTO DIFF WBC: CPT

## 2023-11-10 PROCEDURE — 2500000004 HC RX 250 GENERAL PHARMACY W/ HCPCS (ALT 636 FOR OP/ED)

## 2023-11-10 PROCEDURE — 86300 IMMUNOASSAY TUMOR CA 15-3: CPT

## 2023-11-10 PROCEDURE — 96365 THER/PROPH/DIAG IV INF INIT: CPT | Mod: INF

## 2023-11-10 PROCEDURE — 99215 OFFICE O/P EST HI 40 MIN: CPT

## 2023-11-10 PROCEDURE — 84075 ASSAY ALKALINE PHOSPHATASE: CPT

## 2023-11-10 PROCEDURE — 36415 COLL VENOUS BLD VENIPUNCTURE: CPT

## 2023-11-10 PROCEDURE — 83735 ASSAY OF MAGNESIUM: CPT

## 2023-11-10 PROCEDURE — 1126F AMNT PAIN NOTED NONE PRSNT: CPT

## 2023-11-10 RX ORDER — CHOLECALCIFEROL (VITAMIN D3) 50 MCG
50 TABLET ORAL DAILY
COMMUNITY

## 2023-11-10 RX ORDER — EPINEPHRINE 0.3 MG/.3ML
0.3 INJECTION SUBCUTANEOUS EVERY 5 MIN PRN
Status: CANCELLED | OUTPATIENT
Start: 2024-02-02

## 2023-11-10 RX ORDER — LETROZOLE 2.5 MG/1
2.5 TABLET, FILM COATED ORAL DAILY
Qty: 90 TABLET | Refills: 1 | Status: SHIPPED | OUTPATIENT
Start: 2023-11-10 | End: 2024-05-16 | Stop reason: SDUPTHER

## 2023-11-10 RX ORDER — HEPARIN SODIUM,PORCINE/PF 10 UNIT/ML
50 SYRINGE (ML) INTRAVENOUS AS NEEDED
Status: DISCONTINUED | OUTPATIENT
Start: 2023-11-10 | End: 2023-11-10 | Stop reason: HOSPADM

## 2023-11-10 RX ORDER — DIPHENHYDRAMINE HYDROCHLORIDE 50 MG/ML
50 INJECTION INTRAMUSCULAR; INTRAVENOUS AS NEEDED
Status: CANCELLED | OUTPATIENT
Start: 2024-02-02

## 2023-11-10 RX ORDER — HEPARIN SODIUM,PORCINE/PF 10 UNIT/ML
50 SYRINGE (ML) INTRAVENOUS AS NEEDED
Status: CANCELLED | OUTPATIENT
Start: 2023-11-10

## 2023-11-10 RX ORDER — HEPARIN 100 UNIT/ML
500 SYRINGE INTRAVENOUS AS NEEDED
Status: CANCELLED | OUTPATIENT
Start: 2023-11-10

## 2023-11-10 RX ORDER — HEPARIN 100 UNIT/ML
500 SYRINGE INTRAVENOUS AS NEEDED
Status: DISCONTINUED | OUTPATIENT
Start: 2023-11-10 | End: 2023-11-10 | Stop reason: HOSPADM

## 2023-11-10 RX ORDER — FAMOTIDINE 10 MG/ML
20 INJECTION INTRAVENOUS ONCE AS NEEDED
Status: CANCELLED | OUTPATIENT
Start: 2024-02-02

## 2023-11-10 RX ORDER — GLUCOSAM/CHONDRO/HERB 149/HYAL 750-100 MG
TABLET ORAL
COMMUNITY

## 2023-11-10 RX ORDER — ALBUTEROL SULFATE 0.83 MG/ML
3 SOLUTION RESPIRATORY (INHALATION) AS NEEDED
Status: CANCELLED | OUTPATIENT
Start: 2024-02-02

## 2023-11-10 RX ADMIN — SODIUM CHLORIDE 500 ML: 9 INJECTION, SOLUTION INTRAVENOUS at 11:55

## 2023-11-10 RX ADMIN — ZOLEDRONIC ACID 4 MG: 4 INJECTION, SOLUTION, CONCENTRATE INTRAVENOUS at 11:54

## 2023-11-10 ASSESSMENT — PAIN SCALES - GENERAL
PAINLEVEL: 0-NO PAIN
PAINLEVEL_OUTOF10: 0-NO PAIN

## 2023-11-10 NOTE — ASSESSMENT & PLAN NOTE
Compression of neurovascular structures without overt brachial plexopathy.  - Systemic therapy, as above. Markedly improved.  - Continue compression sleeve and home massage   - For muscle cramping she can try heat / massage of the hands and feet. Electrolytes WNL

## 2023-11-10 NOTE — ASSESSMENT & PLAN NOTE
- Continue Ibrance + letrozole (will work on Pfizer mark renewal) Letrozole refill sent  - Next re-staging scans will be for Feb 2024

## 2023-11-13 ENCOUNTER — OFFICE VISIT (OUTPATIENT)
Dept: GASTROENTEROLOGY | Facility: EXTERNAL LOCATION | Age: 73
End: 2023-11-13
Payer: MEDICARE

## 2023-11-13 DIAGNOSIS — C50.919 MALIGNANT NEOPLASM OF FEMALE BREAST, UNSPECIFIED ESTROGEN RECEPTOR STATUS, UNSPECIFIED LATERALITY, UNSPECIFIED SITE OF BREAST (MULTI): ICD-10-CM

## 2023-11-13 DIAGNOSIS — Z86.010 PERSONAL HISTORY OF COLONIC POLYPS: Primary | ICD-10-CM

## 2023-11-13 DIAGNOSIS — Z12.11 ENCOUNTER FOR SCREENING FOR MALIGNANT NEOPLASM OF COLON: ICD-10-CM

## 2023-11-13 PROCEDURE — 45378 DIAGNOSTIC COLONOSCOPY: CPT | Performed by: INTERNAL MEDICINE

## 2023-11-13 PROCEDURE — 1126F AMNT PAIN NOTED NONE PRSNT: CPT | Performed by: INTERNAL MEDICINE

## 2023-11-17 ENCOUNTER — TELEMEDICINE (OUTPATIENT)
Dept: PALLIATIVE MEDICINE | Facility: CLINIC | Age: 73
End: 2023-11-17
Payer: MEDICARE

## 2023-11-17 DIAGNOSIS — Z51.5 PALLIATIVE CARE ENCOUNTER: Primary | ICD-10-CM

## 2023-11-17 DIAGNOSIS — D49.3 BREAST NEOPLASM: ICD-10-CM

## 2023-11-17 DIAGNOSIS — G89.3 CANCER RELATED PAIN: ICD-10-CM

## 2023-11-17 PROCEDURE — 99443 PR PHYS/QHP TELEPHONE EVALUATION 21-30 MIN: CPT

## 2023-11-17 RX ORDER — PREGABALIN 50 MG/1
50 CAPSULE ORAL 2 TIMES DAILY
Qty: 60 CAPSULE | Refills: 1 | Status: SHIPPED | OUTPATIENT
Start: 2023-11-17 | End: 2024-01-29 | Stop reason: SDUPTHER

## 2023-11-17 NOTE — PROGRESS NOTES
SUPPORTIVE AND PALLIATIVE ONCOLOGY OUTPATIENT FOLLOW-UP    Virtual or Telephone Consent    A telephone visit (audio only) between the patient (at the originating site) and the provider (at the distant site) was utilized to provide this telehealth service.   Verbal consent was requested and obtained from Gracy Espino on this date, 11/17/23 for a telehealth visit.       SERVICE DATE: 11/17/2023    Subjective   HISTORY OF PRESENT ILLNESS: Gracy Espino is a 73 y.o. female who presents with  hx. of recurrent metastatic breast cancer (other sites: LN, pleura, bone). She is currently on Letrozole + palbociclib.      Pain Assessment:  Pain Score:  4  Location:  RUE (CIPN, lymphedema)  Education:  changes to medication regimen    Symptom Assessment:  Pain:somewhat  Headache: none  Dizziness:none  Lack of energy: none  Difficulty sleeping: a little  Worrying: none  Anxiety: none  Depression: none  Pain in mouth/swallowing: none  Dry mouth: none  Taste changes: none  Shortness of breath: none  Lack of appetite: none   Nausea: none  Vomiting: none  Constipation: none  Diarrhea: none  Sore muscles: a little  Numbness or tingling in hands/feet/other: very much  Weight loss: none  Other: none      Information obtained from: chart review, interview of patient, and interview of family  ______________________________________________________________________        Objective     No results found for this or any previous visit (from the past 96 hour(s)).             PHYSICAL EXAMINATION   Vital Signs: not performed, virtual visit     Physical Exam -not performed, virtual visit      ASSESSMENT/PLAN    Pain  Pain is: cancer related pain  Type: neuropathic; CIPN  Pain control: sub-optimally controlled  Home regimen: Duloxetine 90mg daily with diner, Tramadol 50mg q8h as needed  11/17/23: pt verbalizes a worsening in intensity in CIPN. States that, although she was previously hesitant to add additional medications to her regimen,  she is now open to this. Start Pregabalin 50mg BID (sent by team physician to pharmacy in [place]- per pt request).  Intolerances/previously tried: Gabapentin (felt sleepy, did not find the medication to be beneficial)  Personalized pain goal: n/a    Constipation  At risk for constipation related to opioids,   currently not constipated    Sleeping Difficulty:  Impaired sleep related to CIPN  Home regimen:  see pain notes    Supportive Interventions: n/a- will continue to evaluate needs    Supportive and Palliative Oncology encounter:  Spoke with patient and Michael via virtual platform  Emotional support provided  Coordination of care  We will continue to follow and address symptoms as needed    Medical Decision Making/Goals of Care/Advance Care Planning:  Patient's current clinical condition, including diagnosis, prognosis, and management plan, and goals of care were discussed.   Life limiting disease: metastatic malignancy  Family: Supportive   Performance status: Major limitations due to pain  Goals: symptom control and cancer directed therapy    Advance Directives  Existence of Advance Directives:Yes, documentation or copy in medical record  Decision maker: HCPOA is Michael  Code Status: Full code    Next Follow-Up Visit:  Return to clinic in 3 months to align with oncology    Signature and billing  Medical complexity was moderate level due to due to complexity of problems, extensive data review, and high risk of management/treatment.  Time was spent on the following: Prep Time, Time Directly with Patient/Family/Caregiver, Documentation Time. Total time spent: 35min      Data  Diagnostic tests and information reviewed for today's visit:  Most recent labs and imaging results, Medications     11/17/23: changes to plan indicated in bold. Continue all other regimens as listed.    Some elements copied from Supportive Oncology note on 7/31/23, the elements have been updated and all reflect  current decision making from today, 11/17/2023.      Plan of Care discussed with: Provider, RN, Patient and Family/Significant Other:     SIGNATURE: ZAIRA Brito    Contact information:  Supportive and Palliative Oncology  Monday-Friday 8 AM-5 PM  Phone:  [phone], press option #5, then option #1.   Or Epic Secure Chat

## 2023-11-20 ENCOUNTER — TELEPHONE (OUTPATIENT)
Dept: HEMATOLOGY/ONCOLOGY | Facility: HOSPITAL | Age: 73
End: 2023-11-20
Payer: MEDICARE

## 2023-11-20 NOTE — TELEPHONE ENCOUNTER
Patient reports she wanted to verify when she can take her lyrica, tramadol, and duloxetine. We reviewed to take her duloxetine at dinner time, lyrica BID, and tramadol Q 8 hours PRN. She reports she only takes her tramadol once in the evening. I told her she can space out the lyrica and tramadol in the evening to avoid sedation. We discussed being careful overnight with getting up and going to the bathroom as she adjusts to this new combination of medications. She has take 2 doses of lyrica and it makes her a little sleepy. She verbalized understanding and will call back with questions or concerns.

## 2024-01-10 ENCOUNTER — SOCIAL WORK (OUTPATIENT)
Dept: CASE MANAGEMENT | Facility: HOSPITAL | Age: 74
End: 2024-01-10
Payer: MEDICARE

## 2024-01-10 NOTE — PROGRESS NOTES
Received email from spouse asking about status of Ibrance application. I contacted [employer] and they stated that the application was not legible. KENYON completed a new application and sent patient and physician their respective forms to sign as part of the application. Once they re returned, Kenyon will refax to the company.    ZION Perry

## 2024-01-11 ENCOUNTER — SOCIAL WORK (OUTPATIENT)
Dept: CASE MANAGEMENT | Facility: HOSPITAL | Age: 74
End: 2024-01-11
Payer: MEDICARE

## 2024-01-11 NOTE — PROGRESS NOTES
Ibrance application has been faxed to [facility] [facility] [phone]. A copy of the application was sent to patient and spouse.     ZION Perry

## 2024-01-11 NOTE — PROGRESS NOTES
Sw and patient received call from [employer] indicating that patient must first register with [employer] for financial assistance before [employer] will help. SW spoke with patient and spouse and they will go to the online portal to register. Will await their  experience with completing application.    ZION Perry

## 2024-01-16 ENCOUNTER — TELEPHONE (OUTPATIENT)
Dept: HEMATOLOGY/ONCOLOGY | Facility: HOSPITAL | Age: 74
End: 2024-01-16
Payer: MEDICARE

## 2024-01-16 ENCOUNTER — SOCIAL WORK (OUTPATIENT)
Dept: CASE MANAGEMENT | Facility: HOSPITAL | Age: 74
End: 2024-01-16
Payer: MEDICARE

## 2024-01-16 NOTE — PROGRESS NOTES
SW called [employer] together this date and was informed that patient's application is in process. There is nothing more that the patient/family need to do. Patient has been informed of this call.    Yoshi Perry

## 2024-01-26 ENCOUNTER — SOCIAL WORK (OUTPATIENT)
Dept: CASE MANAGEMENT | Facility: HOSPITAL | Age: 74
End: 2024-01-26
Payer: MEDICARE

## 2024-01-26 NOTE — PROGRESS NOTES
Spouse emailed indicating that they have not heard anything from [employer] in [place] patient assistance program. I contacted [employer] representative Nuvia ext. [phone]. She indicated that patient has to be down to 1 week of mediation before they will expedite the application. They can also make a next day order at that point. OSMAN phoned patient and spouse and informed them of the situation. OSMAN will call [facility] on Monday 1/29/2024 to ask for an expedited approval of the application. Patient and spouse are in agreement with the plan.    ZION Perry

## 2024-01-29 ENCOUNTER — SOCIAL WORK (OUTPATIENT)
Dept: CASE MANAGEMENT | Facility: HOSPITAL | Age: 74
End: 2024-01-29
Payer: MEDICARE

## 2024-01-29 ENCOUNTER — TELEPHONE (OUTPATIENT)
Dept: ADMISSION | Facility: HOSPITAL | Age: 74
End: 2024-01-29
Payer: MEDICARE

## 2024-01-29 DIAGNOSIS — G89.3 CANCER RELATED PAIN: ICD-10-CM

## 2024-01-29 DIAGNOSIS — Z51.5 PALLIATIVE CARE ENCOUNTER: ICD-10-CM

## 2024-01-29 RX ORDER — PREGABALIN 50 MG/1
50 CAPSULE ORAL 2 TIMES DAILY
Qty: 180 CAPSULE | Refills: 0 | Status: SHIPPED | OUTPATIENT
Start: 2024-01-29 | End: 2024-04-24 | Stop reason: SDUPTHER

## 2024-01-29 NOTE — TELEPHONE ENCOUNTER
Patient was supposed to have an appointment with DAVE Farias on 2/7 when she is home from [place] (she is home 2/3-2/10), but it is not in [facility]. I will place her on the wait list. A 3 month supply will be submitted to her pharmacy for lyrica. Patient will check with her  to see when they are coming back to [place] (sometime in May). She will either make this supply last or as her MD down there if he can provider 1 refill until she is seen by DAVE Farias. I recommended that she get a 1 month refill from that provider so she doenst have to stretch her medication. Next year if she goes to [place], we discussed proactively asking her provider to take over this prescription while she is there for 4 months. We will call patient tomorrow to schedule her May appointment. She is aware if we have a cancellation the week of 2/3-2/10 we will call her.

## 2024-01-29 NOTE — PROGRESS NOTES
Fax was sent to [email or web address] alerting them to fact that patient has less than 7 days worth of medication=, I have been unable to reach them by phone. Patient and spouse informed.    ZION Perry

## 2024-01-29 NOTE — TELEPHONE ENCOUNTER
Patient last seen by DAVE Farias on 11/17 with plan to start lyrica 50mg BID. No fuv is scheduled. I'll place and order for this. [facility] is not currently functioning. Ok per DAVE Farias to submit a 90 day supply to Dr. Arthur to sign as patient is in [place] currently.

## 2024-01-30 ENCOUNTER — SOCIAL WORK (OUTPATIENT)
Dept: CASE MANAGEMENT | Facility: HOSPITAL | Age: 74
End: 2024-01-30
Payer: MEDICARE

## 2024-01-30 NOTE — PROGRESS NOTES
was able to connect with [employer] [facility] together program and spoke with representative Rosalinda. Patient's case is being worked on. The company prioritizes patients who are on their last week of medication. As of this date 1/30/2024, patient has 6 days remaining of medications with 1 week off-making a total of 13 days. Next week makes her situation more urgent and that is when they would make a determination. Will inform patient and spouse of this information.    ZION Murphy

## 2024-02-05 ENCOUNTER — LAB (OUTPATIENT)
Dept: LAB | Facility: HOSPITAL | Age: 74
End: 2024-02-05
Payer: MEDICARE

## 2024-02-05 ENCOUNTER — SOCIAL WORK (OUTPATIENT)
Dept: CASE MANAGEMENT | Facility: HOSPITAL | Age: 74
End: 2024-02-05
Payer: MEDICARE

## 2024-02-05 DIAGNOSIS — Z17.0 MALIGNANT NEOPLASM OF RIGHT BREAST IN FEMALE, ESTROGEN RECEPTOR POSITIVE, UNSPECIFIED SITE OF BREAST (MULTI): ICD-10-CM

## 2024-02-05 DIAGNOSIS — C50.911 MALIGNANT NEOPLASM OF RIGHT BREAST IN FEMALE, ESTROGEN RECEPTOR POSITIVE, UNSPECIFIED SITE OF BREAST (MULTI): ICD-10-CM

## 2024-02-05 LAB
ALBUMIN SERPL BCP-MCNC: 4.1 G/DL (ref 3.4–5)
ALP SERPL-CCNC: 74 U/L (ref 33–136)
ALT SERPL W P-5'-P-CCNC: 14 U/L (ref 7–45)
ANION GAP SERPL CALC-SCNC: 14 MMOL/L (ref 10–20)
AST SERPL W P-5'-P-CCNC: 16 U/L (ref 9–39)
BASOPHILS # BLD AUTO: 0.02 X10*3/UL (ref 0–0.1)
BASOPHILS NFR BLD AUTO: 0.7 %
BILIRUB SERPL-MCNC: 0.7 MG/DL (ref 0–1.2)
BUN SERPL-MCNC: 23 MG/DL (ref 6–23)
CALCIUM SERPL-MCNC: 9.8 MG/DL (ref 8.6–10.3)
CANCER AG27-29 SERPL-ACNC: 24.8 U/ML (ref 0–38.6)
CHLORIDE SERPL-SCNC: 99 MMOL/L (ref 98–107)
CO2 SERPL-SCNC: 27 MMOL/L (ref 21–32)
CREAT SERPL-MCNC: 0.82 MG/DL (ref 0.5–1.05)
EGFRCR SERPLBLD CKD-EPI 2021: 75 ML/MIN/1.73M*2
EOSINOPHIL # BLD AUTO: 0.04 X10*3/UL (ref 0–0.4)
EOSINOPHIL NFR BLD AUTO: 1.4 %
ERYTHROCYTE [DISTWIDTH] IN BLOOD BY AUTOMATED COUNT: 13.8 % (ref 11.5–14.5)
GLUCOSE SERPL-MCNC: 116 MG/DL (ref 74–99)
HCT VFR BLD AUTO: 37.2 % (ref 36–46)
HGB BLD-MCNC: 12.7 G/DL (ref 12–16)
IMM GRANULOCYTES # BLD AUTO: 0.03 X10*3/UL (ref 0–0.5)
IMM GRANULOCYTES NFR BLD AUTO: 1.1 % (ref 0–0.9)
LYMPHOCYTES # BLD AUTO: 0.91 X10*3/UL (ref 0.8–3)
LYMPHOCYTES NFR BLD AUTO: 32.4 %
MAGNESIUM SERPL-MCNC: 1.84 MG/DL (ref 1.6–2.4)
MCH RBC QN AUTO: 36.1 PG (ref 26–34)
MCHC RBC AUTO-ENTMCNC: 34.1 G/DL (ref 32–36)
MCV RBC AUTO: 106 FL (ref 80–100)
MONOCYTES # BLD AUTO: 0.27 X10*3/UL (ref 0.05–0.8)
MONOCYTES NFR BLD AUTO: 9.6 %
NEUTROPHILS # BLD AUTO: 1.54 X10*3/UL (ref 1.6–5.5)
NEUTROPHILS NFR BLD AUTO: 54.8 %
NRBC BLD-RTO: 0 /100 WBCS (ref 0–0)
PHOSPHATE SERPL-MCNC: 3.6 MG/DL (ref 2.5–4.9)
PLATELET # BLD AUTO: 129 X10*3/UL (ref 150–450)
POTASSIUM SERPL-SCNC: 4.2 MMOL/L (ref 3.5–5.3)
PROT SERPL-MCNC: 7.8 G/DL (ref 6.4–8.2)
RBC # BLD AUTO: 3.52 X10*6/UL (ref 4–5.2)
SODIUM SERPL-SCNC: 136 MMOL/L (ref 136–145)
WBC # BLD AUTO: 2.8 X10*3/UL (ref 4.4–11.3)

## 2024-02-05 PROCEDURE — 83735 ASSAY OF MAGNESIUM: CPT

## 2024-02-05 PROCEDURE — 36415 COLL VENOUS BLD VENIPUNCTURE: CPT

## 2024-02-05 PROCEDURE — 80053 COMPREHEN METABOLIC PANEL: CPT

## 2024-02-05 PROCEDURE — 86300 IMMUNOASSAY TUMOR CA 15-3: CPT

## 2024-02-05 PROCEDURE — 85025 COMPLETE CBC W/AUTO DIFF WBC: CPT

## 2024-02-05 PROCEDURE — 84100 ASSAY OF PHOSPHORUS: CPT

## 2024-02-05 NOTE — PROGRESS NOTES
following up with [employer] re: Ibrance approval. Called [employer] and still no decision has been made. Suggested that Sw call in two days. For update: (Patient has 7 days left of meds). SW called spouse to inform hm of the call.    ZION Medina

## 2024-02-05 NOTE — PROGRESS NOTES
See provation   Detail Level: Detailed General Sunscreen Counseling: I recommended a broad spectrum sunscreen with a SPF of 30 or higher.  I explained that SPF 30 sunscreens block approximately 97 percent of the sun's harmful rays.  Sunscreens should be applied at least 15 minutes prior to expected sun exposure and then every 2 hours after that as long as sun exposure continues. If swimming or exercising sunscreen should be reapplied every 45 minutes to an hour after getting wet or sweating.  One ounce, or the equivalent of a shot glass full of sunscreen, is adequate to protect the skin not covered by a bathing suit. I also recommended a lip balm with a sunscreen as well. Sun protective clothing can be used in lieu of sunscreen but must be worn the entire time you are exposed to the sun's rays.

## 2024-02-06 ENCOUNTER — HOSPITAL ENCOUNTER (OUTPATIENT)
Dept: RADIOLOGY | Facility: HOSPITAL | Age: 74
Discharge: HOME | End: 2024-02-06
Payer: MEDICARE

## 2024-02-06 ENCOUNTER — SOCIAL WORK (OUTPATIENT)
Dept: CASE MANAGEMENT | Facility: HOSPITAL | Age: 74
End: 2024-02-06
Payer: MEDICARE

## 2024-02-06 DIAGNOSIS — C50.912 CARCINOMA OF LEFT BREAST METASTATIC TO LUNG (MULTI): ICD-10-CM

## 2024-02-06 DIAGNOSIS — C78.02 CARCINOMA OF LEFT BREAST METASTATIC TO LUNG (MULTI): ICD-10-CM

## 2024-02-06 DIAGNOSIS — Z17.0 MALIGNANT NEOPLASM OF RIGHT BREAST IN FEMALE, ESTROGEN RECEPTOR POSITIVE, UNSPECIFIED SITE OF BREAST (MULTI): ICD-10-CM

## 2024-02-06 DIAGNOSIS — C50.911 MALIGNANT NEOPLASM OF RIGHT BREAST IN FEMALE, ESTROGEN RECEPTOR POSITIVE, UNSPECIFIED SITE OF BREAST (MULTI): ICD-10-CM

## 2024-02-06 PROCEDURE — 78306 BONE IMAGING WHOLE BODY: CPT | Performed by: STUDENT IN AN ORGANIZED HEALTH CARE EDUCATION/TRAINING PROGRAM

## 2024-02-06 PROCEDURE — 78306 BONE IMAGING WHOLE BODY: CPT

## 2024-02-06 PROCEDURE — 2550000001 HC RX 255 CONTRASTS

## 2024-02-06 PROCEDURE — 74177 CT ABD & PELVIS W/CONTRAST: CPT | Performed by: RADIOLOGY

## 2024-02-06 PROCEDURE — 71260 CT THORAX DX C+: CPT | Performed by: RADIOLOGY

## 2024-02-06 PROCEDURE — 3430000001 HC RX 343 DIAGNOSTIC RADIOPHARMACEUTICALS

## 2024-02-06 PROCEDURE — 74177 CT ABD & PELVIS W/CONTRAST: CPT

## 2024-02-06 PROCEDURE — A9503 TC99M MEDRONATE: HCPCS

## 2024-02-06 RX ADMIN — IOHEXOL 75 ML: 350 INJECTION, SOLUTION INTRAVENOUS at 10:54

## 2024-02-06 RX ADMIN — TECHNETIUM TC 99M MEDRONATE 26.5 MILLICURIE: 25 INJECTION, POWDER, FOR SOLUTION INTRAVENOUS at 10:15

## 2024-02-06 NOTE — PROGRESS NOTES
Phone call received from spouse and [employer]izer representative indicating that patient has been approved for the [employer] patient assistance program for another year beginning today 2/6/2024 and ending 12/31/2024. They will be sending out a confirmation letter as well.    ZION Perry

## 2024-02-07 ENCOUNTER — OFFICE VISIT (OUTPATIENT)
Dept: PALLIATIVE MEDICINE | Facility: HOSPITAL | Age: 74
End: 2024-02-07
Payer: MEDICARE

## 2024-02-07 VITALS
OXYGEN SATURATION: 100 % | WEIGHT: 167.77 LBS | RESPIRATION RATE: 17 BRPM | SYSTOLIC BLOOD PRESSURE: 113 MMHG | BODY MASS INDEX: 24.37 KG/M2 | TEMPERATURE: 97.2 F | DIASTOLIC BLOOD PRESSURE: 62 MMHG | HEART RATE: 76 BPM

## 2024-02-07 DIAGNOSIS — G89.3 CANCER RELATED PAIN: ICD-10-CM

## 2024-02-07 DIAGNOSIS — Z51.5 PALLIATIVE CARE ENCOUNTER: ICD-10-CM

## 2024-02-07 PROCEDURE — 99214 OFFICE O/P EST MOD 30 MIN: CPT

## 2024-02-07 PROCEDURE — 1036F TOBACCO NON-USER: CPT

## 2024-02-07 PROCEDURE — 1157F ADVNC CARE PLAN IN RCRD: CPT

## 2024-02-07 PROCEDURE — 1159F MED LIST DOCD IN RCRD: CPT

## 2024-02-07 PROCEDURE — 1125F AMNT PAIN NOTED PAIN PRSNT: CPT

## 2024-02-07 ASSESSMENT — PAIN SCALES - GENERAL: PAINLEVEL: 4

## 2024-02-07 NOTE — PROGRESS NOTES
SUPPORTIVE AND PALLIATIVE ONCOLOGY OUTPATIENT FOLLOW-UP      SERVICE DATE: 2/7/2024    Subjective   HISTORY OF PRESENT ILLNESS: Gracy Espino is a 73 y.o. female who presents with who presents with  hx. of recurrent metastatic breast cancer (other sites: LN, pleura, bone). She is currently on Letrozole + palbociclib.        Pain Assessment:  Pain Score:  moderate   Location:  bilateral hands and feet - primarily in left hand  Education:  current pain regimen     Symptom Assessment:  Pain:somewhat  Headache: none  Dizziness:none  Lack of energy: a little  Difficulty sleeping: none  Worrying: none  Anxiety: none  Depression: none  Shortness of breath: none  Lack of appetite: none   Nausea: none  Vomiting: none  Constipation: none  Diarrhea: none  Numbness or tingling in hands/feet/other: very much  Weight loss: none    Information obtained from: chart review and interview of patient  ______________________________________________________________________        Objective   Results for orders placed or performed in visit on 02/05/24 (from the past 96 hour(s))   CBC and Auto Differential   Result Value Ref Range    WBC 2.8 (L) 4.4 - 11.3 x10*3/uL    nRBC 0.0 0.0 - 0.0 /100 WBCs    RBC 3.52 (L) 4.00 - 5.20 x10*6/uL    Hemoglobin 12.7 12.0 - 16.0 g/dL    Hematocrit 37.2 36.0 - 46.0 %     (H) 80 - 100 fL    MCH 36.1 (H) 26.0 - 34.0 pg    MCHC 34.1 32.0 - 36.0 g/dL    RDW 13.8 11.5 - 14.5 %    Platelets 129 (L) 150 - 450 x10*3/uL    Neutrophils % 54.8 40.0 - 80.0 %    Immature Granulocytes %, Automated 1.1 (H) 0.0 - 0.9 %    Lymphocytes % 32.4 13.0 - 44.0 %    Monocytes % 9.6 2.0 - 10.0 %    Eosinophils % 1.4 0.0 - 6.0 %    Basophils % 0.7 0.0 - 2.0 %    Neutrophils Absolute 1.54 (L) 1.60 - 5.50 x10*3/uL    Immature Granulocytes Absolute, Automated 0.03 0.00 - 0.50 x10*3/uL    Lymphocytes Absolute 0.91 0.80 - 3.00 x10*3/uL    Monocytes Absolute 0.27 0.05 - 0.80 x10*3/uL    Eosinophils Absolute 0.04 0.00 - 0.40  "x10*3/uL    Basophils Absolute 0.02 0.00 - 0.10 x10*3/uL   Comprehensive metabolic panel   Result Value Ref Range    Glucose 116 (H) 74 - 99 mg/dL    Sodium 136 136 - 145 mmol/L    Potassium 4.2 3.5 - 5.3 mmol/L    Chloride 99 98 - 107 mmol/L    Bicarbonate 27 21 - 32 mmol/L    Anion Gap 14 10 - 20 mmol/L    Urea Nitrogen 23 6 - 23 mg/dL    Creatinine 0.82 0.50 - 1.05 mg/dL    eGFR 75 >60 mL/min/1.73m*2    Calcium 9.8 8.6 - 10.3 mg/dL    Albumin 4.1 3.4 - 5.0 g/dL    Alkaline Phosphatase 74 33 - 136 U/L    Total Protein 7.8 6.4 - 8.2 g/dL    AST 16 9 - 39 U/L    Bilirubin, Total 0.7 0.0 - 1.2 mg/dL    ALT 14 7 - 45 U/L   Cancer Antigen 27-29   Result Value Ref Range    CA 27.29 24.8 0.0 - 38.6 U/mL   Magnesium   Result Value Ref Range    Magnesium 1.84 1.60 - 2.40 mg/dL   Phosphorus   Result Value Ref Range    Phosphorus 3.6 2.5 - 4.9 mg/dL                  PHYSICAL EXAMINATION   Vital Signs:   Vital signs reviewed        1/17/2023    12:05 PM 1/26/2023     9:46 AM 1/31/2023     8:50 AM 1/31/2023     3:47 PM 2/9/2023    11:21 AM 11/10/2023     9:49 AM 2/7/2024    10:39 AM   Vitals   Systolic 129 131 132 136 117 112 113   Diastolic 78 80 69 73 88 67 62   Heart Rate 82 93 80 86 77 71 76   Temp 36.4 °C (97.5 °F) 36.4 °C (97.5 °F) 36.6 °C (97.9 °F)   36 °C (96.8 °F) 36.2 °C (97.2 °F)   Resp 18 20 18 18 18 20 17   Height (in) 1.767 m (5' 9.57\") 1.767 m (5' 9.57\") 1.767 m (5' 9.57\")   1.767 m (5' 9.57\")    Weight (lb)  159.39 159.17   161.38 167.77   BMI 23 kg/m2 23.16 kg/m2 23.12 kg/m2   23.44 kg/m2 24.37 kg/m2   BSA (m2) 1.88 m2 1.88 m2 1.88 m2   1.9 m2 1.93 m2   Visit Report      Report Report          Physical Exam  Constitutional:       Appearance: She is normal weight.   HENT:      Head: Normocephalic.      Mouth/Throat:      Mouth: Mucous membranes are moist.      Pharynx: Oropharynx is clear.   Eyes:      Conjunctiva/sclera: Conjunctivae normal.      Pupils: Pupils are equal, round, and reactive to light. "   Pulmonary:      Effort: Pulmonary effort is normal.   Abdominal:      General: Abdomen is flat.   Musculoskeletal:         General: Swelling present. Normal range of motion.      Cervical back: Normal range of motion.      Comments: Right arm lymphedema      Skin:     General: Skin is warm and dry.   Neurological:      General: No focal deficit present.      Mental Status: She is alert.   Psychiatric:         Mood and Affect: Mood normal.         Behavior: Behavior normal.         ASSESSMENT/PLAN    Pain  Pain is: cancer related pain  Type: neuropathic; CIPN  Pain control: sub-optimally controlled  Home regimen: Duloxetine 90mg daily with diner, Tramadol 50mg q8h as needed  2/7/24: Increase pregabalin to 50mg TID (refill will need to be sent to pharmacy in [place])  Advised to call if dose increase is too sedating   Intolerances/previously tried: Gabapentin (felt sleepy)    Constipation  At risk for constipation related to opioids,   currently not constipated     Sleeping Difficulty:  Impaired sleep related to CIPN  Home regimen:  see pain notes     Supportive Interventions: n/a- will continue to evaluate needs     Supportive and Palliative Oncology encounter:  Spoke with patient and Michael garcia via virtual platform  Emotional support provided  Coordination of care  We will continue to follow and address symptoms as needed     Medical Decision Making/Goals of Care/Advance Care Planning:  Patient's current clinical condition, including diagnosis, prognosis, and management plan, and goals of care were discussed.   Life limiting disease: metastatic malignancy  Family: Supportive   Performance status: Major limitations due to pain  Goals: symptom control and cancer directed therapy     Advance Directives  Existence of Advance Directives:Yes, documentation or copy in medical record  Decision maker: HCPOA is Michael garcia  Code Status: Full code    Next Follow-Up Visit:  Return to clinic in May 2024 as  scheduled.     Signature and billing  Medical complexity was moderate level due to due to complexity of problems, extensive data review, and high risk of management/treatment.  Time was spent on the following: Prep Time, Time Directly with Patient/Family/Caregiver, Documentation Time. Total time spent: 30 minutes      Data  Diagnostic tests and information reviewed for today's visit:  Most recent labs and imaging results, Medications     2/7/24 changes to plan indicated in bold. Continue all other regimens as listed.       Some elements copied from Supportive Oncology note on 11/17/23 , the elements have been updated and all reflect current decision making from today, 2/7/2024.      Plan of Care discussed with: Patient, Family/Significant Other: , and RN    SIGNATURE: ZAIRA Owens    Contact information:  Supportive and Palliative Oncology  Monday-Friday 8 AM-5 PM  Phone:  [phone], press option #5, then option #1.   Or Epic Secure Chat       I, ZAIRA Brito, attest to the above assessment and plan.

## 2024-02-08 ENCOUNTER — OFFICE VISIT (OUTPATIENT)
Dept: HEMATOLOGY/ONCOLOGY | Facility: HOSPITAL | Age: 74
End: 2024-02-08
Payer: MEDICARE

## 2024-02-08 ENCOUNTER — INFUSION (OUTPATIENT)
Dept: HEMATOLOGY/ONCOLOGY | Facility: HOSPITAL | Age: 74
End: 2024-02-08
Payer: MEDICARE

## 2024-02-08 VITALS
TEMPERATURE: 96.8 F | WEIGHT: 169.75 LBS | BODY MASS INDEX: 24.3 KG/M2 | HEART RATE: 65 BPM | OXYGEN SATURATION: 92 % | HEIGHT: 70 IN | DIASTOLIC BLOOD PRESSURE: 57 MMHG | SYSTOLIC BLOOD PRESSURE: 113 MMHG | RESPIRATION RATE: 16 BRPM

## 2024-02-08 DIAGNOSIS — M89.9 BONE DISORDER: ICD-10-CM

## 2024-02-08 DIAGNOSIS — C78.02 CARCINOMA OF LEFT BREAST METASTATIC TO LUNG (MULTI): ICD-10-CM

## 2024-02-08 DIAGNOSIS — Z51.5 PALLIATIVE CARE ENCOUNTER: ICD-10-CM

## 2024-02-08 DIAGNOSIS — J90 BILATERAL PLEURAL EFFUSION: ICD-10-CM

## 2024-02-08 DIAGNOSIS — Z17.0 MALIGNANT NEOPLASM OF BREAST IN FEMALE, ESTROGEN RECEPTOR POSITIVE, UNSPECIFIED LATERALITY, UNSPECIFIED SITE OF BREAST (MULTI): ICD-10-CM

## 2024-02-08 DIAGNOSIS — Z79.83 ENCOUNTER FOR MONITORING ZOLEDRONIC ACID THERAPY: Primary | ICD-10-CM

## 2024-02-08 DIAGNOSIS — C50.911 MALIGNANT NEOPLASM OF RIGHT BREAST IN FEMALE, ESTROGEN RECEPTOR POSITIVE, UNSPECIFIED SITE OF BREAST (MULTI): ICD-10-CM

## 2024-02-08 DIAGNOSIS — Z51.81 ENCOUNTER FOR MONITORING ZOLEDRONIC ACID THERAPY: Primary | ICD-10-CM

## 2024-02-08 DIAGNOSIS — Z17.0 MALIGNANT NEOPLASM OF RIGHT BREAST IN FEMALE, ESTROGEN RECEPTOR POSITIVE, UNSPECIFIED SITE OF BREAST (MULTI): ICD-10-CM

## 2024-02-08 DIAGNOSIS — C50.919 MALIGNANT NEOPLASM OF BREAST IN FEMALE, ESTROGEN RECEPTOR POSITIVE, UNSPECIFIED LATERALITY, UNSPECIFIED SITE OF BREAST (MULTI): ICD-10-CM

## 2024-02-08 DIAGNOSIS — C50.912 CARCINOMA OF LEFT BREAST METASTATIC TO LUNG (MULTI): ICD-10-CM

## 2024-02-08 DIAGNOSIS — Z79.811 USE OF LETROZOLE (FEMARA): ICD-10-CM

## 2024-02-08 DIAGNOSIS — I89.0 LYMPHEDEMA OF RIGHT ARM: ICD-10-CM

## 2024-02-08 PROCEDURE — 99214 OFFICE O/P EST MOD 30 MIN: CPT | Performed by: INTERNAL MEDICINE

## 2024-02-08 PROCEDURE — 96365 THER/PROPH/DIAG IV INF INIT: CPT | Mod: INF

## 2024-02-08 PROCEDURE — 1036F TOBACCO NON-USER: CPT | Performed by: INTERNAL MEDICINE

## 2024-02-08 PROCEDURE — 1125F AMNT PAIN NOTED PAIN PRSNT: CPT | Performed by: INTERNAL MEDICINE

## 2024-02-08 PROCEDURE — 2500000004 HC RX 250 GENERAL PHARMACY W/ HCPCS (ALT 636 FOR OP/ED)

## 2024-02-08 PROCEDURE — 1157F ADVNC CARE PLAN IN RCRD: CPT | Performed by: INTERNAL MEDICINE

## 2024-02-08 PROCEDURE — 1159F MED LIST DOCD IN RCRD: CPT | Performed by: INTERNAL MEDICINE

## 2024-02-08 RX ORDER — EPINEPHRINE 0.3 MG/.3ML
0.3 INJECTION SUBCUTANEOUS EVERY 5 MIN PRN
Status: CANCELLED | OUTPATIENT
Start: 2024-04-25

## 2024-02-08 RX ORDER — DIPHENHYDRAMINE HYDROCHLORIDE 50 MG/ML
50 INJECTION INTRAMUSCULAR; INTRAVENOUS AS NEEDED
Status: CANCELLED | OUTPATIENT
Start: 2024-04-25

## 2024-02-08 RX ORDER — FAMOTIDINE 10 MG/ML
20 INJECTION INTRAVENOUS ONCE AS NEEDED
Status: CANCELLED | OUTPATIENT
Start: 2024-04-25

## 2024-02-08 RX ORDER — FAMOTIDINE 10 MG/ML
20 INJECTION INTRAVENOUS ONCE AS NEEDED
Status: DISCONTINUED | OUTPATIENT
Start: 2024-02-08 | End: 2024-02-08 | Stop reason: HOSPADM

## 2024-02-08 RX ORDER — EPINEPHRINE 0.3 MG/.3ML
0.3 INJECTION SUBCUTANEOUS EVERY 5 MIN PRN
Status: DISCONTINUED | OUTPATIENT
Start: 2024-02-08 | End: 2024-02-08 | Stop reason: HOSPADM

## 2024-02-08 RX ORDER — ALBUTEROL SULFATE 0.83 MG/ML
3 SOLUTION RESPIRATORY (INHALATION) AS NEEDED
Status: DISCONTINUED | OUTPATIENT
Start: 2024-02-08 | End: 2024-02-08 | Stop reason: HOSPADM

## 2024-02-08 RX ORDER — ALBUTEROL SULFATE 0.83 MG/ML
3 SOLUTION RESPIRATORY (INHALATION) AS NEEDED
Status: CANCELLED | OUTPATIENT
Start: 2024-04-25

## 2024-02-08 RX ORDER — DIPHENHYDRAMINE HYDROCHLORIDE 50 MG/ML
50 INJECTION INTRAMUSCULAR; INTRAVENOUS AS NEEDED
Status: DISCONTINUED | OUTPATIENT
Start: 2024-02-08 | End: 2024-02-08 | Stop reason: HOSPADM

## 2024-02-08 RX ADMIN — SODIUM CHLORIDE 500 ML: 9 INJECTION, SOLUTION INTRAVENOUS at 11:32

## 2024-02-08 RX ADMIN — ZOLEDRONIC ACID 4 MG: 4 INJECTION, SOLUTION, CONCENTRATE INTRAVENOUS at 11:32

## 2024-02-08 ASSESSMENT — PAIN SCALES - GENERAL: PAINLEVEL: 4

## 2024-02-09 ENCOUNTER — ONCOLOGY MEDICATION OUTREACH (OUTPATIENT)
Dept: HEMATOLOGY/ONCOLOGY | Facility: CLINIC | Age: 74
End: 2024-02-09
Payer: MEDICARE

## 2024-02-14 NOTE — PROGRESS NOTES
Patient ID: Gracy Espino is a 73 y.o. female.  The patient presents to clinic today for her history of metastatic ER+/Her2- breast cancer.    Diagnostic/Therapeutic History:  Diagnosis: Recurrent/metastatic breast cancer.  ER >95% MD 10% Her2-negative (0 IHC).  Sites of Disease: LN's, pleura, bone.  NGS: PIK3CA     -2007: Right T1c N1a ER/MD+ Her2- breast cancer.  -TAC x6 cycles.  -RT completed 3/2008.  -Completed 5 years of anastrozole 4/2013. Two more years, stopped 6/2015.  -1/7/2023: Presented to ER with dyspnea and right lymphedema. CTA showed large right-sided pleural effusion, small left pleural effusion, nodular opacities in MARITZA and lingual, right axillary soft tissue lesion (3.7 x 4.1 cm) with nodular opacities in  subcutaneous tissues of right chest wall.  -1/13/23: PET/CT showed hypermetabolic disease in multiple supraclavicular, axillary, mediastinal, hilar, periaortic LN’s. Osseous lesions in axial and appendicular skeleton, pleural nodularities in right hemithorax, focus of activity in right  posterior triceps muscle.  -1/13/23: RUE MRI confirmed right masslike axillary LAD with mass effect on the neurovascular bundle without occlusion.  -1/20/23: Right axillary biopsy confirmed metastatic IDC, ER >95% MD 10% Her2-negative (0 by IHC). NGS testing showed PIK3CA  mutation.  -1/2023: Letrozole initiated.  -2/7/23: Palbociclib initiated.  -5/11/23: Zoledronic acid initiated (q12 weeks).       History of Present Illness (HPI)/Interval History:  Ms. Espino presents today for routine FUV and discussion of her CT results.  She is accompanied by her  today.  She has been feeling well overall.  Lymphedema has been controlled.  Neuropathy persistent- following with Supportive Oncology, and Lyrica is being increased.  No dyspnea, abdominal pain, nausea/vomiting. Returned to [place] soon.  No new symptoms from letrozole or Ibrance.    Review of Systems:  14-point ROS otherwise negative, as per HPI.    Past  Medical History:   Diagnosis Date    Breast cancer (CMS/[facility])     Disease of thyroid gland     Hypertension     Lymphedema     Neuropathy      Social History     Socioeconomic History    Marital status:      Spouse name: None    Number of children: None    Years of education: None    Highest education level: None   Occupational History    None   Tobacco Use    Smoking status: Never    Smokeless tobacco: Never   Vaping Use    Vaping Use: Never used   Substance and Sexual Activity    Alcohol use: Yes     Alcohol/week: 2.0 standard drinks of alcohol     Types: 1 Glasses of wine, 1 Cans of beer per week    Drug use: Never    Sexual activity: None   Other Topics Concern    None   Social History Narrative    None     Social Determinants of Health     Financial Resource Strain: Not on file   Food Insecurity: Not on file   Transportation Needs: Not on file   Physical Activity: Not on file   Stress: Not on file   Social Connections: Not on file   Intimate Partner Violence: Not on file   Housing Stability: Not on file       Allergies   Allergen Reactions    Penicillins Unknown         Current Outpatient Medications:     clindamycin (Cleocin) 300 mg capsule, Take by mouth., Disp: , Rfl:     docusate sodium (Colace) 100 mg capsule, Take 2 capsules (200 mg) by mouth once daily at bedtime., Disp: , Rfl:     DULoxetine (Cymbalta) 30 mg DR capsule, Take 1 capsule (30 mg) by mouth once daily., Disp: , Rfl:     DULoxetine (Cymbalta) 60 mg DR capsule, Take 1 capsule (60 mg) by mouth once daily., Disp: , Rfl:     furosemide (Lasix) 20 mg tablet, Take 1 tablet (20 mg) by mouth once daily., Disp: , Rfl:     letrozole (Femara) 2.5 mg tablet, Take 1 tablet (2.5 mg total) by mouth once daily., Disp: 90 tablet, Rfl: 1    levothyroxine (Synthroid, Levoxyl) 50 mcg tablet, Take by mouth., Disp: , Rfl:     metoprolol succinate XL (Toprol-XL) 100 mg 24 hr tablet, Take 1 tablet (100 mg) by mouth once daily., Disp: , Rfl:     omega  "3-dha-epa-fish oil (Fish OiL) 1,000 mg (120 mg-180 mg) capsule, Take by mouth., Disp: , Rfl:     pregabalin (Lyrica) 50 mg capsule, Take 1 capsule (50 mg) by mouth 2 times a day., Disp: 180 capsule, Rfl: 0    spironolacton-hydrochlorothiaz (Aldactazide) 25-25 mg tablet, , Disp: , Rfl:     traMADol (Ultram) 50 mg tablet, , Disp: , Rfl:     amLODIPine (Norvasc) 2.5 mg tablet, Take by mouth., Disp: , Rfl:     cholecalciferol (Vitamin D3) 50 MCG (2000 UT) tablet, Take 1 tablet (50 mcg) by mouth once daily., Disp: , Rfl:     neomycin-polymyxin-dexAMETHasone (Maxitrol) 3.5mg/mL-10,000 unit/mL-0.1 % ophthalmic suspension, , Disp: , Rfl:     ondansetron (Zofran) 8 mg tablet, , Disp: , Rfl:     potassium chloride CR 20 mEq ER tablet, Take 1 tablet (20 mEq) by mouth once daily., Disp: , Rfl:      Objective    BSA: 1.94 meters squared  /57   Pulse 65   Temp 36 °C (96.8 °F) (Temporal)   Resp 16   Ht 1.767 m (5' 9.57\")   Wt 77 kg (169 lb 12.1 oz)   SpO2 92%   BMI 24.66 kg/m²     Performance Status:  The ECOG performance scale today is ECO- Restricted in physically strenuous activity.  Carries out light duty.    Physical Exam  Vitals reviewed.   Constitutional:       General: She is awake. She is not in acute distress.     Appearance: Normal appearance. She is not ill-appearing.   HENT:      Head: Normocephalic and atraumatic.      Mouth/Throat:      Mouth: Mucous membranes are moist.      Pharynx: Oropharynx is clear. No oropharyngeal exudate.   Eyes:      General: No scleral icterus.     Conjunctiva/sclera: Conjunctivae normal.   Neck:      Trachea: Trachea and phonation normal. No tracheal tenderness.   Cardiovascular:      Rate and Rhythm: Normal rate and regular rhythm.      Heart sounds: No murmur heard.     No friction rub. No gallop.   Pulmonary:      Effort: Pulmonary effort is normal. No respiratory distress.      Breath sounds: Normal breath sounds. No stridor. No wheezing, rhonchi or rales. "   Abdominal:      Palpations: There is no mass.   Musculoskeletal:         General: Swelling (Right arm; compression sleeve in place.) present.      Right shoulder: Decreased range of motion.      Cervical back: No tenderness.      Thoracic back: No tenderness.      Lumbar back: No tenderness.   Lymphadenopathy:      Cervical: No cervical adenopathy.      Upper Body:      Right upper body: No supraclavicular adenopathy.      Left upper body: No supraclavicular adenopathy.   Skin:     General: Skin is warm and dry.      Coloration: Skin is not jaundiced.      Findings: No lesion or rash.   Neurological:      General: No focal deficit present.      Mental Status: She is alert and oriented to person, place, and time.      Motor: No weakness.      Gait: Gait normal.   Psychiatric:         Mood and Affect: Mood normal.         Thought Content: Thought content normal.         Judgment: Judgment normal.         Laboratory Data:  Lab Results   Component Value Date    WBC 2.8 (L) 02/05/2024    HGB 12.7 02/05/2024    HCT 37.2 02/05/2024     (H) 02/05/2024     (L) 02/05/2024       Chemistry    Lab Results   Component Value Date/Time     02/05/2024 0945    K 4.2 02/05/2024 0945    CL 99 02/05/2024 0945    CO2 27 02/05/2024 0945    BUN 23 02/05/2024 0945    CREATININE 0.82 02/05/2024 0945    Lab Results   Component Value Date/Time    CALCIUM 9.8 02/05/2024 0945    ALKPHOS 74 02/05/2024 0945    AST 16 02/05/2024 0945    ALT 14 02/05/2024 0945    BILITOT 0.7 02/05/2024 0945             Radiology:  CT chest abdomen pelvis w IV contrast  Narrative: Interpreted By:  Elliott Alston,   STUDY:  CT CHEST ABDOMEN PELVIS W IV CONTRAST;  2/6/2024 11:02 am      INDICATION:  Signs/Symptoms:metastatic breast cancer.      COMPARISON:  CT of the chest, abdomen, and pelvis 07/28/2023      ACCESSION NUMBER(S):  [id number]      ORDERING CLINICIAN:  JAMI LAUREN      TECHNIQUE:  CT of the chest, abdomen, and pelvis was  performed.  Contiguous axial  images were obtained at 3 mm slice thickness through the chest,  abdomen and pelvis. Coronal and sagittal reconstructions at 3 mm  slice thickness were performed.  75 ml of contrast Omnipaque 350 were administered intravenously  without immediate complication.      FINDINGS:  CHEST:      LUNG/PLEURA/LARGE AIRWAYS:  No endobronchial lesion is seen.      Moderate right-sided layering effusion with a small component along  the posterior aspect of the major fissure is mildly decreased in size  since previous comparison examination. There is increased nodular  peripheral opacification of the right middle lobe anteriorly with  regions of reticulation centrally suggestive of region of chronic  scarring or chronic volume loss/atelectasis.      Interval slightly increased size of semi solid nodule the right lung  apex, now measuring 1.6 cm, previously measuring up to 1.4 cm. No new  nodules are seen.      VESSELS:  The thoracic aorta is unchanged with respect course, caliber, and  contour.      The main pulmonary artery and its branches are unchanged with respect  course, caliber, and contour      Coronary atherosclerotic calcifications.      HEART:  Heart size unchanged no pericardial effusion.      MEDIASTINUM AND GILBERT:  No pathologic enlarged mediastinal lymph nodes by CT criteria.  Subcentimeter mediastinal lymph nodes are noted, nonspecific and  possibly reactive.      CHEST WALL AND LOWER NECK:  No acute osseous abnormality. Osseous structures appear stable.  Unchanged appearance of previously seen sclerotic lesions likely  representing metastasis. 1 cm nodular focus along the right chest  wall (series 201, image 73), previously measuring up to 1.1 cm.  Circumscribed soft tissue density of the right axillary region which  appears to partially encase vessels, now measuring up to 5.6 x 2.3 by  5.7 cm, previously measured at 6.2 x 2.8 x 5.2 cm. Stable  postsurgical changes from bilateral  mastectomy and reconstruction  with bilateral breast implants.      ABDOMEN:      LIVER:  Scattered subcentimeter hypodense lesions, not significantly changed  since previous examination. Similar size of peripherally calcified  lesion along the medial aspect of hepatic segment VI (series 201,  image 133) measuring up to 14 mm.      BILE DUCTS:  No significant biliary dilitation.      GALLBLADDER:  No calcified gallstones.      PANCREAS:  Unremarkable.      SPLEEN:  Unremarkable.      ADRENAL GLANDS:  Unremarkable.      KIDNEYS AND URETERS:  The kidneys enhance symmetrically.  Unchanged appearance of  indeterminate lesion of the left kidney midpole posteriorly measures  up to 1.6 cm. Additional scattered presumed renal cysts.      PELVIS:      BLADDER:  Decompressed.      REPRODUCTIVE ORGANS:  No pelvic masses.      BOWEL:  Colonic diverticulosis without evidence of acute diverticulitis. No  pathologic distension of visualized large or small bowel.          VESSELS:  Atherosclerotic changes noted about the aortoiliac axis without  evidence of aneurysm.      PERITONEUM/RETROPERITONEUM/LYMPH NODES:  No ascites or free air, no fluid collection.  Subcentimeter  retroperitoneal and mesenteric lymph nodes, not significantly changed  and nonspecific.      BONE AND SOFT TISSUE:  No acute osseous abnormality. Osseous structures appear stable.  Scattered sclerotic lesions of the pelvis likely representing  metastatic disease, not significantly changed.      Impression: CHEST:  1.  Overall, no significant interval improvement in previously seen  biopsy-proven metastatic disease of the right axillary region.  2. Slight interval increased semi solid nodule the right lung apex.  3. Slightly improved right-sided pleural effusion which is  persistently moderately.  4. Similar appearing osseous metastatic disease burden.  5. Remaining findings appear similar to prior comparison imaging  07/28/2023.      ABDOMEN-PELVIS:  1.  No new  subdiaphragmatic metastatic disease.  2. Stable osseous metastatic disease burden.  3. Stable size of indeterminate lesion left kidney midpole  posteriorly. Attention on follow-up examinations.  4. Remaining subdiaphragmatic findings appear stable.          Signed by: Elliott Alston 2/7/2024 12:02 PM  Dictation workstation:   [id number]       Assessment/Plan:  Gracy Espino is a 73 y.o. female with a history of metastatic ER+/Her2- breast cancer, who presents today follow-up evaluation.    Breast cancer.  - Reviewed CT CAP and bone scan in detail today. Overall stable/improved disease. There was one lung nodule in the right lower lung that was slightly larger. Will plan on repeating CT chest in 3 months to ensure stability.  - Clinically, she continues to do quite well. CA27-29 continues to decline.  - Continue letrozole 2.5 mg daily.  - Continue Ibrance 125 mg daily (on days 1-21 of a 28-day cycle). Labs okay for ongoing treatment.    Osseous metastases.  - Continue Zometa q3 months. Given today.  - Continue Ca/VitD supplementation.    Lymphedema. Compression of neurovascular structures without overt brachial plexopathy.  - Systemic therapy, as above. Markedly improved.  - Previously followed with PT/OT.  - Wears compression sleeve and massage device at home.    Problem List Items Addressed This Visit             ICD-10-CM    Malignant neoplasm of unspecified site of unspecified female breast (CMS/HCC) C50.919    Relevant Orders    Clinic Appointment Request Chemo Follow Up; ABIMAEL HORVATH    Infusion Appointment Request [facility] INFUSION (Zometa)    CT chest w IV contrast    CBC and Auto Differential    Comprehensive Metabolic Panel    Vitamin D 25-Hydroxy,Total (for eval of Vitamin D levels)     Other Visit Diagnoses         Codes    Bone disorder     M89.9    Relevant Orders    Vitamin D 25-Hydroxy,Total (for eval of Vitamin D levels)          Disposition.  - RTC 3 months CT Chest, labs, Zometa.  - She has our  contact information and was instructed to call with concerns/questions in the interim.    Sloan Garber MD  Hematology and Medical Oncology  [facility]

## 2024-04-22 ENCOUNTER — APPOINTMENT (OUTPATIENT)
Dept: HEMATOLOGY/ONCOLOGY | Facility: HOSPITAL | Age: 74
End: 2024-04-22
Payer: MEDICARE

## 2024-04-24 ENCOUNTER — TELEPHONE (OUTPATIENT)
Dept: PALLIATIVE MEDICINE | Facility: CLINIC | Age: 74
End: 2024-04-24

## 2024-04-24 DIAGNOSIS — Z51.5 PALLIATIVE CARE ENCOUNTER: ICD-10-CM

## 2024-04-24 DIAGNOSIS — G89.3 CANCER RELATED PAIN: ICD-10-CM

## 2024-04-24 RX ORDER — PREGABALIN 50 MG/1
50 CAPSULE ORAL 3 TIMES DAILY
Qty: 270 CAPSULE | Refills: 0 | Status: SHIPPED | OUTPATIENT
Start: 2024-04-24 | End: 2024-05-15 | Stop reason: SDUPTHER

## 2024-04-24 NOTE — TELEPHONE ENCOUNTER
Patient last seen by DAVE Anne on 2/7 with plan to increase lyrica to 50mg TID. Follow up visit is scheduled for 5/15 with DAVE Farias. OARRS currently not working. Ok per DAVE Farias to date prescription based on when last prescription was sent. Prescription pended to Dr. Arthur as patient is requesting refill in [place].

## 2024-05-02 ENCOUNTER — APPOINTMENT (OUTPATIENT)
Dept: HEMATOLOGY/ONCOLOGY | Facility: HOSPITAL | Age: 74
End: 2024-05-02
Payer: MEDICARE

## 2024-05-13 ENCOUNTER — APPOINTMENT (OUTPATIENT)
Dept: LAB | Facility: CLINIC | Age: 74
End: 2024-05-13
Payer: MEDICARE

## 2024-05-13 ENCOUNTER — LAB (OUTPATIENT)
Dept: LAB | Facility: CLINIC | Age: 74
End: 2024-05-13
Payer: MEDICARE

## 2024-05-13 DIAGNOSIS — M89.9 BONE DISORDER: ICD-10-CM

## 2024-05-13 DIAGNOSIS — C50.911 MALIGNANT NEOPLASM OF RIGHT BREAST IN FEMALE, ESTROGEN RECEPTOR POSITIVE, UNSPECIFIED SITE OF BREAST (MULTI): ICD-10-CM

## 2024-05-13 DIAGNOSIS — Z17.0 MALIGNANT NEOPLASM OF RIGHT BREAST IN FEMALE, ESTROGEN RECEPTOR POSITIVE, UNSPECIFIED SITE OF BREAST (MULTI): ICD-10-CM

## 2024-05-13 LAB
25(OH)D3 SERPL-MCNC: 53 NG/ML (ref 30–100)
ALBUMIN SERPL BCP-MCNC: 3.9 G/DL (ref 3.4–5)
ALP SERPL-CCNC: 78 U/L (ref 33–136)
ALT SERPL W P-5'-P-CCNC: 13 U/L (ref 7–45)
ANION GAP SERPL CALC-SCNC: 13 MMOL/L (ref 10–20)
AST SERPL W P-5'-P-CCNC: 15 U/L (ref 9–39)
BASOPHILS # BLD AUTO: 0.03 X10*3/UL (ref 0–0.1)
BASOPHILS NFR BLD AUTO: 1.1 %
BILIRUB SERPL-MCNC: 0.5 MG/DL (ref 0–1.2)
BUN SERPL-MCNC: 22 MG/DL (ref 6–23)
CALCIUM SERPL-MCNC: 9.4 MG/DL (ref 8.6–10.6)
CHLORIDE SERPL-SCNC: 100 MMOL/L (ref 98–107)
CO2 SERPL-SCNC: 29 MMOL/L (ref 21–32)
CREAT SERPL-MCNC: 0.96 MG/DL (ref 0.5–1.05)
EGFRCR SERPLBLD CKD-EPI 2021: 62 ML/MIN/1.73M*2
EOSINOPHIL # BLD AUTO: 0.05 X10*3/UL (ref 0–0.4)
EOSINOPHIL NFR BLD AUTO: 1.8 %
ERYTHROCYTE [DISTWIDTH] IN BLOOD BY AUTOMATED COUNT: 13.5 % (ref 11.5–14.5)
GLUCOSE SERPL-MCNC: 110 MG/DL (ref 74–99)
HCT VFR BLD AUTO: 39.6 % (ref 36–46)
HGB BLD-MCNC: 12.9 G/DL (ref 12–16)
IMM GRANULOCYTES # BLD AUTO: 0.01 X10*3/UL (ref 0–0.5)
IMM GRANULOCYTES NFR BLD AUTO: 0.4 % (ref 0–0.9)
LYMPHOCYTES # BLD AUTO: 0.81 X10*3/UL (ref 0.8–3)
LYMPHOCYTES NFR BLD AUTO: 29.2 %
MCH RBC QN AUTO: 36.4 PG (ref 26–34)
MCHC RBC AUTO-ENTMCNC: 32.6 G/DL (ref 32–36)
MCV RBC AUTO: 112 FL (ref 80–100)
MONOCYTES # BLD AUTO: 0.23 X10*3/UL (ref 0.05–0.8)
MONOCYTES NFR BLD AUTO: 8.3 %
NEUTROPHILS # BLD AUTO: 1.64 X10*3/UL (ref 1.6–5.5)
NEUTROPHILS NFR BLD AUTO: 59.2 %
NRBC BLD-RTO: ABNORMAL /100{WBCS}
PLATELET # BLD AUTO: 199 X10*3/UL (ref 150–450)
POTASSIUM SERPL-SCNC: 4.3 MMOL/L (ref 3.5–5.3)
PROT SERPL-MCNC: 7.3 G/DL (ref 6.4–8.2)
RBC # BLD AUTO: 3.54 X10*6/UL (ref 4–5.2)
SODIUM SERPL-SCNC: 138 MMOL/L (ref 136–145)
WBC # BLD AUTO: 2.8 X10*3/UL (ref 4.4–11.3)

## 2024-05-13 PROCEDURE — 85025 COMPLETE CBC W/AUTO DIFF WBC: CPT

## 2024-05-13 PROCEDURE — 80053 COMPREHEN METABOLIC PANEL: CPT

## 2024-05-13 PROCEDURE — 82306 VITAMIN D 25 HYDROXY: CPT

## 2024-05-13 PROCEDURE — 36415 COLL VENOUS BLD VENIPUNCTURE: CPT

## 2024-05-15 ENCOUNTER — HOSPITAL ENCOUNTER (OUTPATIENT)
Dept: RADIOLOGY | Facility: HOSPITAL | Age: 74
Discharge: HOME | End: 2024-05-15
Payer: MEDICARE

## 2024-05-15 ENCOUNTER — OFFICE VISIT (OUTPATIENT)
Dept: PALLIATIVE MEDICINE | Facility: HOSPITAL | Age: 74
End: 2024-05-15
Payer: MEDICARE

## 2024-05-15 ENCOUNTER — APPOINTMENT (OUTPATIENT)
Dept: HEMATOLOGY/ONCOLOGY | Facility: HOSPITAL | Age: 74
End: 2024-05-15
Payer: MEDICARE

## 2024-05-15 VITALS
OXYGEN SATURATION: 98 % | TEMPERATURE: 97 F | HEART RATE: 68 BPM | DIASTOLIC BLOOD PRESSURE: 66 MMHG | WEIGHT: 177.91 LBS | SYSTOLIC BLOOD PRESSURE: 122 MMHG | BODY MASS INDEX: 25.85 KG/M2 | RESPIRATION RATE: 16 BRPM

## 2024-05-15 DIAGNOSIS — G62.0 PERIPHERAL NEUROPATHY DUE TO CHEMOTHERAPY (MULTI): Primary | ICD-10-CM

## 2024-05-15 DIAGNOSIS — Z51.5 PALLIATIVE CARE ENCOUNTER: ICD-10-CM

## 2024-05-15 DIAGNOSIS — T45.1X5A PERIPHERAL NEUROPATHY DUE TO CHEMOTHERAPY (MULTI): Primary | ICD-10-CM

## 2024-05-15 DIAGNOSIS — G89.3 CANCER RELATED PAIN: ICD-10-CM

## 2024-05-15 DIAGNOSIS — Z17.0 MALIGNANT NEOPLASM OF RIGHT BREAST IN FEMALE, ESTROGEN RECEPTOR POSITIVE, UNSPECIFIED SITE OF BREAST (MULTI): ICD-10-CM

## 2024-05-15 DIAGNOSIS — K59.00 CONSTIPATION, UNSPECIFIED CONSTIPATION TYPE: ICD-10-CM

## 2024-05-15 DIAGNOSIS — C50.911 MALIGNANT NEOPLASM OF RIGHT BREAST IN FEMALE, ESTROGEN RECEPTOR POSITIVE, UNSPECIFIED SITE OF BREAST (MULTI): ICD-10-CM

## 2024-05-15 PROCEDURE — 1159F MED LIST DOCD IN RCRD: CPT

## 2024-05-15 PROCEDURE — 1036F TOBACCO NON-USER: CPT

## 2024-05-15 PROCEDURE — 2550000001 HC RX 255 CONTRASTS: Performed by: INTERNAL MEDICINE

## 2024-05-15 PROCEDURE — 99214 OFFICE O/P EST MOD 30 MIN: CPT

## 2024-05-15 PROCEDURE — 71260 CT THORAX DX C+: CPT

## 2024-05-15 PROCEDURE — 71260 CT THORAX DX C+: CPT | Performed by: RADIOLOGY

## 2024-05-15 PROCEDURE — 1125F AMNT PAIN NOTED PAIN PRSNT: CPT

## 2024-05-15 PROCEDURE — 1157F ADVNC CARE PLAN IN RCRD: CPT

## 2024-05-15 RX ORDER — CETIRIZINE HYDROCHLORIDE 10 MG/1
TABLET, CHEWABLE ORAL DAILY
COMMUNITY

## 2024-05-15 RX ORDER — ASPIRIN 81 MG/1
81 TABLET ORAL DAILY
COMMUNITY

## 2024-05-15 RX ORDER — PREGABALIN 50 MG/1
50 CAPSULE ORAL 3 TIMES DAILY
Qty: 270 CAPSULE | Refills: 0 | Status: SHIPPED | OUTPATIENT
Start: 2024-05-15 | End: 2024-08-13

## 2024-05-15 RX ORDER — GUAIFENESIN 600 MG/1
1200 TABLET, EXTENDED RELEASE ORAL 2 TIMES DAILY
COMMUNITY

## 2024-05-15 RX ORDER — ACETAMINOPHEN 500 MG
TABLET ORAL EVERY 6 HOURS PRN
COMMUNITY

## 2024-05-15 RX ADMIN — IOHEXOL 75 ML: 350 INJECTION, SOLUTION INTRAVENOUS at 11:22

## 2024-05-15 ASSESSMENT — PAIN SCALES - GENERAL: PAINLEVEL: 4

## 2024-05-16 ENCOUNTER — OFFICE VISIT (OUTPATIENT)
Dept: HEMATOLOGY/ONCOLOGY | Facility: HOSPITAL | Age: 74
End: 2024-05-16
Payer: MEDICARE

## 2024-05-16 ENCOUNTER — INFUSION (OUTPATIENT)
Dept: HEMATOLOGY/ONCOLOGY | Facility: HOSPITAL | Age: 74
End: 2024-05-16
Payer: MEDICARE

## 2024-05-16 ENCOUNTER — ONCOLOGY MEDICATION OUTREACH (OUTPATIENT)
Dept: HEMATOLOGY/ONCOLOGY | Facility: CLINIC | Age: 74
End: 2024-05-16
Payer: MEDICARE

## 2024-05-16 VITALS
HEART RATE: 67 BPM | RESPIRATION RATE: 20 BRPM | BODY MASS INDEX: 25.38 KG/M2 | WEIGHT: 177.25 LBS | TEMPERATURE: 97.5 F | HEIGHT: 70 IN | SYSTOLIC BLOOD PRESSURE: 123 MMHG | OXYGEN SATURATION: 93 % | DIASTOLIC BLOOD PRESSURE: 69 MMHG

## 2024-05-16 DIAGNOSIS — Z79.811 PROPHYLACTIC USE OF LETROZOLE: ICD-10-CM

## 2024-05-16 DIAGNOSIS — Z17.0 MALIGNANT NEOPLASM OF RIGHT BREAST IN FEMALE, ESTROGEN RECEPTOR POSITIVE, UNSPECIFIED SITE OF BREAST (MULTI): ICD-10-CM

## 2024-05-16 DIAGNOSIS — C50.911 CARCINOMA OF RIGHT BREAST METASTATIC TO BONE (MULTI): ICD-10-CM

## 2024-05-16 DIAGNOSIS — C50.911 MALIGNANT NEOPLASM OF RIGHT BREAST IN FEMALE, ESTROGEN RECEPTOR POSITIVE, UNSPECIFIED SITE OF BREAST (MULTI): ICD-10-CM

## 2024-05-16 DIAGNOSIS — C50.919 MALIGNANT NEOPLASM OF BREAST IN FEMALE, ESTROGEN RECEPTOR POSITIVE, UNSPECIFIED LATERALITY, UNSPECIFIED SITE OF BREAST (MULTI): ICD-10-CM

## 2024-05-16 DIAGNOSIS — C78.02 CARCINOMA OF LEFT BREAST METASTATIC TO LUNG (MULTI): ICD-10-CM

## 2024-05-16 DIAGNOSIS — Z17.0 MALIGNANT NEOPLASM OF BREAST IN FEMALE, ESTROGEN RECEPTOR POSITIVE, UNSPECIFIED LATERALITY, UNSPECIFIED SITE OF BREAST (MULTI): ICD-10-CM

## 2024-05-16 DIAGNOSIS — Z51.81 ENCOUNTER FOR MONITORING ZOLEDRONIC ACID THERAPY: ICD-10-CM

## 2024-05-16 DIAGNOSIS — H57.89 SWOLLEN EYE: Primary | ICD-10-CM

## 2024-05-16 DIAGNOSIS — Z79.83 ENCOUNTER FOR MONITORING ZOLEDRONIC ACID THERAPY: ICD-10-CM

## 2024-05-16 DIAGNOSIS — C79.51 CARCINOMA OF RIGHT BREAST METASTATIC TO BONE (MULTI): ICD-10-CM

## 2024-05-16 DIAGNOSIS — Z51.81 ENCOUNTER FOR THERAPEUTIC DRUG MONITORING: ICD-10-CM

## 2024-05-16 DIAGNOSIS — C50.912 CARCINOMA OF LEFT BREAST METASTATIC TO LUNG (MULTI): ICD-10-CM

## 2024-05-16 PROCEDURE — 96374 THER/PROPH/DIAG INJ IV PUSH: CPT | Mod: INF

## 2024-05-16 PROCEDURE — 1125F AMNT PAIN NOTED PAIN PRSNT: CPT | Performed by: INTERNAL MEDICINE

## 2024-05-16 PROCEDURE — 1157F ADVNC CARE PLAN IN RCRD: CPT | Performed by: INTERNAL MEDICINE

## 2024-05-16 PROCEDURE — 2500000004 HC RX 250 GENERAL PHARMACY W/ HCPCS (ALT 636 FOR OP/ED)

## 2024-05-16 PROCEDURE — 1159F MED LIST DOCD IN RCRD: CPT | Performed by: INTERNAL MEDICINE

## 2024-05-16 PROCEDURE — 96361 HYDRATE IV INFUSION ADD-ON: CPT | Mod: INF

## 2024-05-16 PROCEDURE — 99214 OFFICE O/P EST MOD 30 MIN: CPT | Performed by: INTERNAL MEDICINE

## 2024-05-16 RX ORDER — FAMOTIDINE 10 MG/ML
20 INJECTION INTRAVENOUS ONCE AS NEEDED
OUTPATIENT
Start: 2024-07-18

## 2024-05-16 RX ORDER — HEPARIN 100 UNIT/ML
500 SYRINGE INTRAVENOUS AS NEEDED
OUTPATIENT
Start: 2024-05-16

## 2024-05-16 RX ORDER — EPINEPHRINE 0.3 MG/.3ML
0.3 INJECTION SUBCUTANEOUS EVERY 5 MIN PRN
OUTPATIENT
Start: 2024-07-18

## 2024-05-16 RX ORDER — LETROZOLE 2.5 MG/1
2.5 TABLET, FILM COATED ORAL DAILY
Qty: 90 TABLET | Refills: 3 | Status: SHIPPED | OUTPATIENT
Start: 2024-05-16 | End: 2025-05-16

## 2024-05-16 RX ORDER — ALBUTEROL SULFATE 0.83 MG/ML
3 SOLUTION RESPIRATORY (INHALATION) AS NEEDED
OUTPATIENT
Start: 2024-07-18

## 2024-05-16 RX ORDER — DIPHENHYDRAMINE HYDROCHLORIDE 50 MG/ML
50 INJECTION INTRAMUSCULAR; INTRAVENOUS AS NEEDED
OUTPATIENT
Start: 2024-07-18

## 2024-05-16 RX ORDER — HEPARIN SODIUM,PORCINE/PF 10 UNIT/ML
50 SYRINGE (ML) INTRAVENOUS AS NEEDED
OUTPATIENT
Start: 2024-05-16

## 2024-05-16 RX ADMIN — ZOLEDRONIC ACID 4 MG: 4 INJECTION, SOLUTION, CONCENTRATE INTRAVENOUS at 12:03

## 2024-05-16 RX ADMIN — SODIUM CHLORIDE 500 ML: 9 INJECTION, SOLUTION INTRAVENOUS at 11:29

## 2024-05-16 ASSESSMENT — PAIN SCALES - GENERAL: PAINLEVEL: 4

## 2024-05-16 NOTE — PATIENT INSTRUCTIONS
Please schedule a follow up visit with us in 3 months.  You will follow up with Dr. Oneil     You will have Zometa infusion same day     Please get scans week prior.     Please call us at [phone] option 5 then option 2 with any questions or concerns

## 2024-05-24 ENCOUNTER — PATIENT MESSAGE (OUTPATIENT)
Dept: PALLIATIVE MEDICINE | Facility: CLINIC | Age: 74
End: 2024-05-24
Payer: MEDICARE

## 2024-05-24 DIAGNOSIS — G89.3 CANCER RELATED PAIN: ICD-10-CM

## 2024-05-24 DIAGNOSIS — D49.3 BREAST NEOPLASM: ICD-10-CM

## 2024-05-28 ENCOUNTER — TELEPHONE (OUTPATIENT)
Dept: HEMATOLOGY/ONCOLOGY | Facility: HOSPITAL | Age: 74
End: 2024-05-28
Payer: MEDICARE

## 2024-05-28 RX ORDER — TRAMADOL HYDROCHLORIDE 50 MG/1
50 TABLET ORAL EVERY 8 HOURS PRN
Qty: 90 TABLET | Refills: 0 | Status: SHIPPED | OUTPATIENT
Start: 2024-05-28 | End: 2024-06-27

## 2024-05-28 NOTE — TELEPHONE ENCOUNTER
Patient also messaged in [employer] on 5/24 but it appears the message did not get forwarded to supportive onc/ Diana. See other note.    OARRS report reviewed and reflects  prescription history, no aberrancy noted. Per OARRS, patient last filled tramadol 50mg on 7/30/2023,10 day supply, 60 tabs. Per last visit with Diana patient to continue tramadol 50mg q8h prn. Patient with follow up visit scheduled with Diana on 7/12/24. Patient updated that medication will be sent to [facility]. Refill request routed to covering provider.

## 2024-05-28 NOTE — TELEPHONE ENCOUNTER
Pt was prescribed tramadol 50mg prn by her doctor in [place] and inquires if palliative care will take over prescribing this medication?  She will be in [place] through September.  [facility] [place].

## 2024-05-29 DIAGNOSIS — C50.912 CARCINOMA OF LEFT BREAST METASTATIC TO LUNG (MULTI): ICD-10-CM

## 2024-05-29 DIAGNOSIS — C78.02 CARCINOMA OF LEFT BREAST METASTATIC TO LUNG (MULTI): ICD-10-CM

## 2024-05-31 ENCOUNTER — TELEPHONE (OUTPATIENT)
Dept: HEMATOLOGY/ONCOLOGY | Facility: HOSPITAL | Age: 74
End: 2024-05-31
Payer: MEDICARE

## 2024-05-31 ENCOUNTER — HOSPITAL ENCOUNTER (OUTPATIENT)
Dept: RADIOLOGY | Facility: HOSPITAL | Age: 74
Discharge: HOME | End: 2024-05-31
Payer: MEDICARE

## 2024-05-31 DIAGNOSIS — Z17.0 MALIGNANT NEOPLASM OF RIGHT BREAST IN FEMALE, ESTROGEN RECEPTOR POSITIVE, UNSPECIFIED SITE OF BREAST (MULTI): ICD-10-CM

## 2024-05-31 DIAGNOSIS — C50.911 MALIGNANT NEOPLASM OF RIGHT BREAST IN FEMALE, ESTROGEN RECEPTOR POSITIVE, UNSPECIFIED SITE OF BREAST (MULTI): ICD-10-CM

## 2024-05-31 PROCEDURE — 78306 BONE IMAGING WHOLE BODY: CPT

## 2024-05-31 PROCEDURE — A9503 TC99M MEDRONATE: HCPCS | Performed by: INTERNAL MEDICINE

## 2024-05-31 PROCEDURE — 78306 BONE IMAGING WHOLE BODY: CPT | Performed by: STUDENT IN AN ORGANIZED HEALTH CARE EDUCATION/TRAINING PROGRAM

## 2024-05-31 PROCEDURE — 3430000001 HC RX 343 DIAGNOSTIC RADIOPHARMACEUTICALS: Performed by: INTERNAL MEDICINE

## 2024-05-31 RX ADMIN — TECHNETIUM TC 99M MEDRONATE 27 MILLICURIE: 25 INJECTION, POWDER, FOR SOLUTION INTRAVENOUS at 11:01

## 2024-05-31 NOTE — TELEPHONE ENCOUNTER
Called Ms. Espino with bone scan results- everything stable.  If pain persists, we can consider additional imaging. She has had surgery on right shoulder previously.  Will cancel FUV on 6/13/24.  Pending follow-up with Dr. Betancourt in August.

## 2024-06-12 ENCOUNTER — APPOINTMENT (OUTPATIENT)
Dept: INTEGRATIVE MEDICINE | Facility: CLINIC | Age: 74
End: 2024-06-12
Payer: MEDICARE

## 2024-06-13 ENCOUNTER — APPOINTMENT (OUTPATIENT)
Dept: HEMATOLOGY/ONCOLOGY | Facility: HOSPITAL | Age: 74
End: 2024-06-13
Payer: MEDICARE

## 2024-07-10 ENCOUNTER — APPOINTMENT (OUTPATIENT)
Dept: INTEGRATIVE MEDICINE | Facility: CLINIC | Age: 74
End: 2024-07-10
Payer: MEDICARE

## 2024-07-10 DIAGNOSIS — C50.919 MALIGNANT NEOPLASM OF BREAST IN FEMALE, ESTROGEN RECEPTOR POSITIVE, UNSPECIFIED LATERALITY, UNSPECIFIED SITE OF BREAST (MULTI): Primary | ICD-10-CM

## 2024-07-10 DIAGNOSIS — Z17.0 MALIGNANT NEOPLASM OF BREAST IN FEMALE, ESTROGEN RECEPTOR POSITIVE, UNSPECIFIED LATERALITY, UNSPECIFIED SITE OF BREAST (MULTI): Primary | ICD-10-CM

## 2024-07-10 PROCEDURE — 99205 OFFICE O/P NEW HI 60 MIN: CPT | Performed by: HOSPITALIST

## 2024-07-10 NOTE — PROGRESS NOTES
Patient ID: Gracy Espino is a 73 y.o. female.  Referring Physician: No referring provider defined for this encounter.  Primary Care Provider: Steven Garner MD    CANCER HISTORY:   72 yo woman with MBC - ER+, HER 2 neg to LN's, pleura, bones  NGS: PIK3CA    History:  2007 dx - R breast - T1c - TAC x 6 f/b radiation and AI x 7 yrs    1/23 - SOB/lymphedema with malignant R pleural eff  Metastatic disease    -1/2023: Letrozole initiated.  -2/7/23: Palbociclib initiated.  -5/11/23: Zoledronic acid initiated (q12 weeks).    Lives in [place] for winter    INTEGRATIVE HISTORY:  Symptoms:  Neuropathy - on lyrica, f/b supportive oncology    On duloxetine   Started with chemotherapy (feet)   Then developed numbness to fingers   R shoulder replacement affected as well and had symptoms after that (2022)  Diff raising R shoulder without pain - did PT  R knee/THR    R abd/LBP pain after standing or walking for 10 min   Imaging stable in bones    Diet: tries to eat healthy    PA: ADL's mostly, sometimes walking dogs    Sleep: doing well, using tramadol    Stress: doing well    Natural Products:    D3, Omega 3, asa    ROS:  no ha, visual symptoms, hearing loss  no sob, chest pain, palp  ROS o/w non contributory, please see HPI    Objective    BSA: There is no height or weight on file to calculate BSA.  There were no vitals taken for this visit.    PHYSICAL EXAM:  NAD, awake/alert  HEENT, NCAT, OP clear, no oral lesions  CTA bilat  RRR no mgr  Abd soft/nt/nd+bs  No c/c/e/ttp  Motor/sensory intact, CN 2-12 intact     RESULTS:  Lab Results   Component Value Date    WBC 2.8 (L) 05/13/2024    HGB 12.9 05/13/2024    HCT 39.6 05/13/2024     05/13/2024    CREATININE 0.96 05/13/2024    AST 15 05/13/2024       Integrative Labs:    Functional Tests:    Assessment/Plan   Cancer Staging   Carcinoma of breast metastatic to bone (Multi)  Staging form: Bone - Pelvis, AJCC 8th Edition  - Clinical: cM1b - Unsigned    Carcinoma of left  breast metastatic to lung (Multi)  Staging form: Breast, AJCC 8th Edition  - Clinical: Stage IV (pM1) - Unsigned    72 yo woman with MBC - ER+, HER 2 neg to LN's, pleura, bones  NGS: PIK3CA    History:  2007 dx - R breast - T1c - TAC x 6 f/b radiation and AI x 7 yrs    1/23 - SOB/lymphedema with malignant R pleural eff  Metastatic disease    -1/2023: Letrozole initiated.  -2/7/23: Palbociclib initiated.  -5/11/23: Zoledronic acid initiated (q12 weeks).    CANCER SPECIFIC RECCS:    Breast cancer:  Whole Foods high fiber plant based diet   5-9 fruits/veg/day, Cruciferous Vegetables - Brussel Sprouts, Kale, Broccoli, Cauliflower (7 vegetables to 2 fruit)  Organic dairy preferred  Limit sugar   Limit alcohol   Moderate soy is ok (edamame/tofu) once/day  Fiber including flax/zarina seeds beneficial  Drinking Ensure - Orgain would be preferred    Exercise 30 min/day    Supplements to consider:  [employer] STAMETS 7  Consider role of Melatonin 1-10 mg at night 1 hr prior to sleep  Vitamin D3 [id number] IU/day  Omega 3 supplementation (nordic naturals)    Reading:  [employer]  Cancer Fighting Kitchen    Websites:  Cancerchoices.org  Cook for your Life    Podcast: Integrative Oncology Talk    Apps: Oncio dania ([email or web address])    Support: [employer] (exercise programs, dietitian, support groups, financial support and services)  [employer] HER - passport for integrative services including vegetables/virtual wellness    Symptom Management:  Neuropathy:  Integrative Modalities to consider:  Acupuncture weekly in Integrative Oncology Symptom Management Clinic weekly x 6 weeks then reassess  Massage/Reflexology  [facility] referral    Natural Products/Meds to consider:  Cymbalta, Lyrica - using    Back pain - massage - can help with lymphedema  Acupuncture    Follow Up: 2 months  IO symptom management acu and massage    Thank you for consulting Integrative Oncology  I spent 60 minutes in direct patient care during this  clinical encounter with the patient

## 2024-07-12 ENCOUNTER — OFFICE VISIT (OUTPATIENT)
Dept: PALLIATIVE MEDICINE | Facility: CLINIC | Age: 74
End: 2024-07-12
Payer: MEDICARE

## 2024-07-12 VITALS
BODY MASS INDEX: 25.72 KG/M2 | WEIGHT: 177.03 LBS | SYSTOLIC BLOOD PRESSURE: 120 MMHG | OXYGEN SATURATION: 97 % | HEART RATE: 72 BPM | DIASTOLIC BLOOD PRESSURE: 74 MMHG | TEMPERATURE: 96.4 F | RESPIRATION RATE: 18 BRPM

## 2024-07-12 DIAGNOSIS — C50.919 CARCINOMA OF BREAST METASTATIC TO BONE, UNSPECIFIED LATERALITY (MULTI): Primary | ICD-10-CM

## 2024-07-12 DIAGNOSIS — G62.0 PERIPHERAL NEUROPATHY DUE TO CHEMOTHERAPY (MULTI): ICD-10-CM

## 2024-07-12 DIAGNOSIS — Z51.5 PALLIATIVE CARE ENCOUNTER: ICD-10-CM

## 2024-07-12 DIAGNOSIS — G89.3 CANCER RELATED PAIN: ICD-10-CM

## 2024-07-12 DIAGNOSIS — T45.1X5A PERIPHERAL NEUROPATHY DUE TO CHEMOTHERAPY (MULTI): ICD-10-CM

## 2024-07-12 DIAGNOSIS — C79.51 CARCINOMA OF BREAST METASTATIC TO BONE, UNSPECIFIED LATERALITY (MULTI): Primary | ICD-10-CM

## 2024-07-12 PROCEDURE — 1126F AMNT PAIN NOTED NONE PRSNT: CPT

## 2024-07-12 PROCEDURE — 99214 OFFICE O/P EST MOD 30 MIN: CPT

## 2024-07-12 PROCEDURE — 1157F ADVNC CARE PLAN IN RCRD: CPT

## 2024-07-12 PROCEDURE — 1036F TOBACCO NON-USER: CPT

## 2024-07-12 PROCEDURE — 1159F MED LIST DOCD IN RCRD: CPT

## 2024-07-12 ASSESSMENT — PAIN SCALES - GENERAL: PAINLEVEL: 0-NO PAIN

## 2024-07-12 NOTE — PROGRESS NOTES
"  SUPPORTIVE AND PALLIATIVE ONCOLOGY OUTPATIENT FOLLOW-UP      SERVICE DATE: 7/12/2024    Subjective   HISTORY OF PRESENT ILLNESS: Gracy Espino is a 73 y.o. female who presents with who presents with  hx. of recurrent metastatic breast cancer (other sites: LN, pleura, bone). She is currently on Letrozole + palbociclib.        Pain Assessment:  Pain Score: 4  Location:  bilateral hands and feet - primarily in left hand  Education:  current pain regimen     Symptom Assessment:  Pain:somewhat- lymphedema, CIPN- related  Headache: none  Dizziness:none  Lack of energy: a little  Difficulty sleeping: none  Worrying: none  Anxiety: none  Depression: none  Shortness of breath: none  Lack of appetite: none   Nausea: none  Vomiting: none  Constipation: none  Diarrhea: none  Numbness or tingling in hands/feet/other: somewhat- stable  Weight loss: none    Information obtained from: chart review and interview of patient  ______________________________________________________________________        Objective   No results found for this or any previous visit (from the past 96 hour(s)).             PHYSICAL EXAMINATION   Vital Signs:   Vital signs reviewed        2/9/2023    11:21 AM 11/10/2023     9:49 AM 2/7/2024    10:39 AM 2/8/2024     9:34 AM 5/15/2024    10:07 AM 5/16/2024     9:51 AM 7/12/2024    10:39 AM   Vitals   Systolic 117 112 113 113 122 123 120   Diastolic 88 67 62 57 66 69 74   Heart Rate 77 71 76 65 68 67 72   Temp  36 °C (96.8 °F) 36.2 °C (97.2 °F) 36 °C (96.8 °F) 36.1 °C (97 °F) 36.4 °C (97.5 °F) 35.8 °C (96.4 °F)   Resp 18 20 17 16 16 20 18   Height (in)  1.767 m (5' 9.57\")  1.767 m (5' 9.57\")  1.767 m (5' 9.57\")    Weight (lb)  161.38 167.77 169.75 177.91 177.25 177.03   BMI  23.44 kg/m2 24.37 kg/m2 24.66 kg/m2 25.85 kg/m2 25.75 kg/m2 25.72 kg/m2   BSA (m2)  1.9 m2 1.93 m2 1.94 m2 1.99 m2 1.99 m2 1.99 m2   Visit Report  Report Report Report Report Report Report          Physical Exam  Constitutional:       " Appearance: She is normal weight.   HENT:      Head: Normocephalic.      Mouth/Throat:      Mouth: Mucous membranes are moist.      Pharynx: Oropharynx is clear.   Eyes:      Conjunctiva/sclera: Conjunctivae normal.      Pupils: Pupils are equal, round, and reactive to light.   Pulmonary:      Effort: Pulmonary effort is normal.   Abdominal:      General: Abdomen is flat.   Musculoskeletal:         General: Swelling present. Normal range of motion.      Cervical back: Normal range of motion.      Comments: Right arm lymphedema      Skin:     General: Skin is warm and dry.   Neurological:      General: No focal deficit present.      Mental Status: She is alert.   Psychiatric:         Mood and Affect: Mood normal.         Behavior: Behavior normal.       ASSESSMENT/PLAN    Pain  Pain is: cancer related pain  Type: neuropathic; CIPN  Pain control: sub-optimally controlled  Home regimen:   Duloxetine 90mg daily with dinner  Tramadol 50mg q8h as needed (takes ~1x/day to good relief)  Pregabalin 50mg TID   Intolerances/previously tried: Gabapentin (felt sleepy)  Educated that it is okay to occasionally pre-medicate activity with Tramadol, especially with ongoing goal to reduce sedentary behavior.  Encouraged to increase activity- exercise can be beneficial for both CIPN and constipation- encouraged to try exercising in a pool in the summer months (may be beneficial for lymphedema/ROM)  Referral 5/15/24 to Integrative Oncology- starts acupuncture & massage this month    Overdose Risk Score:000    Constipation  At risk for constipation related to opioids,   intermittent constipation  Encouraged to take OTC stool softener daily and call if this is note helpful.  Encouraged adequate activity & hydration to promote bowel motility.     Sleeping Difficulty:  Impaired sleep related to CIPN  Home regimen:  see pain notes     Supportive Interventions: n/a- will continue to evaluate needs     Supportive and Palliative Oncology  encounter:  Spoke with patient and Michael via virtual platform  Emotional support provided  Coordination of care  We will continue to follow and address symptoms as needed     Medical Decision Making/Goals of Care/Advance Care Planning:  Patient's current clinical condition, including diagnosis, prognosis, and management plan, and goals of care were discussed.   Life limiting disease: metastatic malignancy  Family: Supportive   Performance status: Major limitations due to pain  Goals: symptom control and cancer directed therapy     Advance Directives  Existence of Advance Directives:Yes, documentation or copy in medical record  Decision maker: MARCELAOA is Michael  Code Status: Full code    Next Follow-Up Visit:  Return to clinic in 2 months prior to pt &  traveling back to [place]    Signature and billing  Medical complexity was moderate level due to due to complexity of problems, extensive data review, and high risk of management/treatment.  Time was spent on the following: Prep Time, Time Directly with Patient/Family/Caregiver, Documentation Time. Total time spent: 35minutes      Data  Diagnostic tests and information reviewed for today's visit:  Most recent labs and imaging results, Medications     7/12/24 changes to plan indicated in bold. Continue all other regimens as listed.     Some elements copied from Supportive Oncology note on 5/15/24 , the elements have been updated and all reflect current decision making from today, 7/12/2024.      Plan of Care discussed with: Patient, Family/Significant Other: , and RN    SIGNATURE: ZAIRA Brito    Contact information:  Supportive and Palliative Oncology  Monday-Friday 8 AM-5 PM  Phone:  [phone], press option #5, then option #1.   Or Epic Secure Chat       I, ZAIRA Brito, attest to the above assessment and plan.

## 2024-07-17 ENCOUNTER — APPOINTMENT (OUTPATIENT)
Dept: INTEGRATIVE MEDICINE | Facility: CLINIC | Age: 74
End: 2024-07-17
Payer: MEDICARE

## 2024-07-17 DIAGNOSIS — C50.919 MALIGNANT NEOPLASM OF BREAST IN FEMALE, ESTROGEN RECEPTOR POSITIVE, UNSPECIFIED LATERALITY, UNSPECIFIED SITE OF BREAST (MULTI): Primary | ICD-10-CM

## 2024-07-17 DIAGNOSIS — Z17.0 MALIGNANT NEOPLASM OF BREAST IN FEMALE, ESTROGEN RECEPTOR POSITIVE, UNSPECIFIED LATERALITY, UNSPECIFIED SITE OF BREAST (MULTI): Primary | ICD-10-CM

## 2024-07-17 PROCEDURE — 99214 OFFICE O/P EST MOD 30 MIN: CPT | Performed by: HOSPITALIST

## 2024-07-17 ASSESSMENT — PAIN SCALES - GENERAL: PAINLEVEL_OUTOF10: 5 - MODERATE PAIN

## 2024-07-17 NOTE — PROGRESS NOTES
Acupuncture Visit:     Subjective   Patient ID: Gracy Espino is a 73 y.o. female who presents for No chief complaint on file.  PT with .  She shared that prior to dx she had shoulder surgery.  She is experiencing right sided shoulder and hip pain and low back pain.  The latter 2 occur with movement.  Two years ago she started to have numbness down the back of her right triceps.  She also has CIPN in both feet to  slightly above ankle.  In right hand she also has intermittent cramping in fingers.      Sleep is ok and no GI complaints.  Did try melatonin but does not like taking it.    ** she cannot lay on stomach.  Side laying is ok.               Pre-treatment Assessment  Pain Score: 5 - Moderate pain  Anxiety Level (0-10): 4  Stress Level (0-10): 0  Coping Level (0-10): 8  Depression Level (0-10): 0  Fatigue Level (0-10): 5  Nausea Level (0-10): 0  Wellbeing Level (0-10): 5    Review of Systems         Provider reviewed plan for the acupuncture session, precautions and contraindications. Patient/guardian/hospital staff has given consent to treat with full understanding of what to expect during the session. Before acupuncture began, provider explained to the patient to communicate at any time if the procedure was causing discomfort past their tolerance level. Patient agreed to advise acupuncturist. The acupuncturist counseled the patient on the risks of acupuncture treatment including pain, infection, bleeding, and no relief of pain. The patient was positioned comfortably. There was no evidence of infection at the site of needle insertions.    Objective   Physical Exam    Acupuncture Physical Exam  Tongue Color: Pale body  Tongue Shape: Puffy, Scalloped edges, Yin cracks    Treatment Plan  Treatment Goals: Pain management, Fatigue reduction  Pattern Differentiation: st qi deficiency, sugar imbalance, adrenal imbalance  Treatment Principle: move qi and blood, regulate sugar and adrenals    Acupuncture  Treatment  Needle Guage: 42 guage /.14/ Lime green seirin, 40 guage /.16/ Red seirin  Body Points: With retention, Without retention  Body Points - Bilateral: st qi x1, sp 6, bafeng  Body Points - Right: k 9, li 15, sj 14, gb 21, si 9, 10 ,11,  Needle Count In: 16  Needle Count Out: 16  Needle Retention Time (min): 25 minutes  Total Face to Face Time (min): 25 minutes         Post-treatment Assessment  0-10 (Numeric) Pain Score: 5 - Moderate pain  Anxiety Level (0-10): 1  Stress Level (0-10): 0  Coping Level (0-10): 8  Depression Level (0-10): 0  Fatigue Level (0-10): 4  Nausea Level (0-10): 0  Wellbeing Level (0-10): 6    Assessment/Plan

## 2024-07-17 NOTE — PROGRESS NOTES
Patient ID: Gracy Espino is a 73 y.o. female.  Referring Physician: No referring provider defined for this encounter.  Primary Care Provider: Steven Garner MD     CANCER HISTORY:   74 yo woman with MBC - ER+, HER 2 neg to LN's, pleura, bones  NGS: [id number]     History:  2007 dx - R breast - T1c - TAC x 6 f/b radiation and AI x 7 yrs     1/23 - SOB/lymphedema with malignant R pleural eff  Metastatic disease     -1/2023: Letrozole initiated.  -2/7/23: Palbociclib initiated.  -5/11/23: Zoledronic acid initiated (q12 weeks).     Lives in [place] for winter     INTEGRATIVE HISTORY:  Symptoms:  Neuropathy - on lyrica, f/b supportive oncology               On duloxetine              Started with chemotherapy (feet)              Then developed numbness to fingers              R shoulder replacement affected as well and had symptoms after that (2022)  Diff raising R shoulder without pain - did PT  R knee/THR     R abd/LBP pain after standing or walking for 10 min              Imaging stable in bones     Diet: tries to eat healthy     PA: ADL's mostly, sometimes walking dogs    Sleep: doing well, using tramadol     Stress: doing well     Natural Products:    D3, Omega 3, asa     ROS:  no ha, visual symptoms, hearing loss  no sob, chest pain, palp  ROS o/w non contributory, please see HPI        Objective  BSA: There is no height or weight on file to calculate BSA.  There were no vitals taken for this visit.     PHYSICAL EXAM:  NAD, awake/alert  HEENT, NCAT, OP clear, no oral lesions  CTA bilat  RRR no mgr  Abd soft/nt/nd+bs  No c/c/e/ttp  Motor/sensory intact, CN 2-12 intact      RESULTS:        Lab Results                            Integrative Labs:     Functional Tests:           Assessment/Plan  Cancer Staging   Carcinoma of breast metastatic to bone (Multi)  Staging form: Bone - Pelvis, AJCC 8th Edition  - Clinical: cM1b - Unsigned     Carcinoma of left breast metastatic to lung (Multi)  Staging form: Breast, AJCC 8th  Edition  - Clinical: Stage IV (pM1) - Unsigned     74 yo woman with MBC - ER+, HER 2 neg to LN's, pleura, bones  NGS: [id number]     History:  2007 dx - R breast - T1c - TAC x 6 f/b radiation and AI x 7 yrs     1/23 - SOB/lymphedema with malignant R pleural eff  Metastatic disease     -1/2023: Letrozole initiated.  -2/7/23: Palbociclib initiated.  -5/11/23: Zoledronic acid initiated (q12 weeks).     CANCER SPECIFIC RECCS:     Breast cancer:  Whole Foods high fiber plant based diet   5-9 fruits/veg/day, Cruciferous Vegetables - Brussel Sprouts, Kale, Broccoli, Cauliflower (7 vegetables to 2 fruit)  Organic dairy preferred  Limit sugar              Limit alcohol   Moderate soy is ok (edamame/tofu) once/day  Fiber including flax/zarina seeds beneficial  Drinking Ensure - Orgain would be preferred     Exercise 30 min/day     Supplements to consider:  [employer] STAMETS 7  Consider role of Melatonin 1-10 mg at night 1 hr prior to sleep - she woke up early because of it and does not want to take which I agree  Vitamin D3 6896-2036 IU/day  Omega 3 supplementation (nordic naturals)     Reading:  [employer]  Cancer Fighting Kitchen     Websites:  [email or web address]  Cook for your Life     Podcast: Integrative Oncology Talk     Apps: Oncio dania ([email or web address])     Support: [facility] (exercise programs, dietitian, support groups, financial support and services)  [employer] - passport for integrative services including vegetables/virtual wellness     Symptom Management:  Neuropathy:  Integrative Modalities to consider:  Acupuncture weekly in Integrative Oncology Symptom Management Clinic weekly x 6 weeks then reassess  Massage/Reflexology  [facility] referral     Natural Products/Meds to consider:  Cymbalta, Lyrica - using     Back pain - massage - can help with lymphedema  Acupuncture     Follow Up: 2 months  IO symptom management acu and massage    SYMPTOM MANAGEMENT:  Integrative Oncology Symptom  Management:    The [facility] Integrative Oncology Symptom Management clinic offers multi-disciplinary supervised care of cancer patients using Integrative Modalities billed to insurance using NCCN and SIO/ASCO guideline-driven practices.  ESAS is obtained prior to and after each treatment by the practitioner    Symptoms Managed:  Neuropathy - in feet, trigger finger    Back Pain - mostly shoulder    Natural Products utilized:  D3, Omega 3, asa   Melatonin - may not want to take which is ok    Integrative Treatment: Acupuncture  Session #: 1  Frequency: weekly    Referrals:   Recommendations:    Follow Up:  Symptom Management: weekly  Integrative Oncology:     I have personally seen the patient and supervised the treatment by the integrative practitioner during this visit.  Pt had symptoms discussed and I was present for the patient's 60 minutes of direct patient care.

## 2024-07-31 ENCOUNTER — ALLIED HEALTH (OUTPATIENT)
Dept: INTEGRATIVE MEDICINE | Facility: CLINIC | Age: 74
End: 2024-07-31

## 2024-07-31 DIAGNOSIS — G62.9 PERIPHERAL NEUROPATHY: ICD-10-CM

## 2024-07-31 DIAGNOSIS — M25.611 DECREASED ROM OF RIGHT SHOULDER: Primary | ICD-10-CM

## 2024-07-31 PROCEDURE — 97139 UNLISTED THERAPEUTIC PX: CPT | Performed by: ACUPUNCTURIST

## 2024-07-31 ASSESSMENT — PAIN SCALES - GENERAL: PAINLEVEL_OUTOF10: 4

## 2024-07-31 NOTE — PROGRESS NOTES
Acupuncture Visit:     Subjective   Patient ID: Gracy Espino is a 73 y.o. female who presents for No chief complaint on file.  Seeking continued support for right sided restricted ROM related to shoulder surgery with tingling into all fingers along with CIPN bilaterally in feet        Session Information  Is this acupuncture treatment being billed to the patient's insurance company: No  Visit Type: Follow-up visit    Pre-treatment Assessment  Pain Score: 4  Anxiety Level (0-10): 0  Coping Level (0-10): 8  Depression Level (0-10): 0  Fatigue Level (0-10): 2  Nausea Level (0-10): 0  Wellbeing Level (0-10): 9    Review of Systems         Provider reviewed plan for the acupuncture session, precautions and contraindications. Patient/guardian/hospital staff has given consent to treat with full understanding of what to expect during the session. Before acupuncture began, provider explained to the patient to communicate at any time if the procedure was causing discomfort past their tolerance level. Patient agreed to advise acupuncturist. The acupuncturist counseled the patient on the risks of acupuncture treatment including pain, infection, bleeding, and no relief of pain. The patient was positioned comfortably. There was no evidence of infection at the site of needle insertions.    Objective   Physical Exam              Acupuncture Treatment  Needle Guage: 40 guage /.16/ Red seirin  Body Points - Bilateral: immune (lx2, rx1,) k 9, li 15, st qi x2, sp 3.2, sp 6  Body Points - Right: sj 14, li 14,, sj 15  Needle Count In: 18  Needle Count Out: 18  Needle Retention Time (min): 25 minutes  Total Face to Face Time (min): 25 minutes         Post-treatment Assessment  0-10 (Numeric) Pain Score: 4  Anxiety Level (0-10): 0  Stress Level (0-10): 0  Coping Level (0-10): 8  Depression Level (0-10): 0  Fatigue Level (0-10): 1  Nausea Level (0-10): 0  Wellbeing Level (0-10): 10    Assessment/Plan

## 2024-08-02 ENCOUNTER — HOSPITAL ENCOUNTER (OUTPATIENT)
Dept: RADIOLOGY | Facility: HOSPITAL | Age: 74
Discharge: HOME | End: 2024-08-02
Payer: MEDICARE

## 2024-08-02 ENCOUNTER — APPOINTMENT (OUTPATIENT)
Dept: RADIOLOGY | Facility: HOSPITAL | Age: 74
End: 2024-08-02
Payer: MEDICARE

## 2024-08-02 ENCOUNTER — LAB (OUTPATIENT)
Dept: LAB | Facility: CLINIC | Age: 74
End: 2024-08-02
Payer: MEDICARE

## 2024-08-02 DIAGNOSIS — C50.911 MALIGNANT NEOPLASM OF RIGHT BREAST IN FEMALE, ESTROGEN RECEPTOR POSITIVE, UNSPECIFIED SITE OF BREAST (MULTI): ICD-10-CM

## 2024-08-02 DIAGNOSIS — Z17.0 MALIGNANT NEOPLASM OF BREAST IN FEMALE, ESTROGEN RECEPTOR POSITIVE, UNSPECIFIED LATERALITY, UNSPECIFIED SITE OF BREAST (MULTI): ICD-10-CM

## 2024-08-02 DIAGNOSIS — C78.02 CARCINOMA OF LEFT BREAST METASTATIC TO LUNG (MULTI): ICD-10-CM

## 2024-08-02 DIAGNOSIS — Z17.0 MALIGNANT NEOPLASM OF RIGHT BREAST IN FEMALE, ESTROGEN RECEPTOR POSITIVE, UNSPECIFIED SITE OF BREAST (MULTI): ICD-10-CM

## 2024-08-02 DIAGNOSIS — C50.912 CARCINOMA OF LEFT BREAST METASTATIC TO LUNG (MULTI): ICD-10-CM

## 2024-08-02 DIAGNOSIS — C50.919 MALIGNANT NEOPLASM OF BREAST IN FEMALE, ESTROGEN RECEPTOR POSITIVE, UNSPECIFIED LATERALITY, UNSPECIFIED SITE OF BREAST (MULTI): ICD-10-CM

## 2024-08-02 LAB
ALBUMIN SERPL BCP-MCNC: 3.9 G/DL (ref 3.4–5)
ALP SERPL-CCNC: 81 U/L (ref 33–136)
ALT SERPL W P-5'-P-CCNC: 13 U/L (ref 7–45)
ANION GAP SERPL CALC-SCNC: 13 MMOL/L (ref 10–20)
AST SERPL W P-5'-P-CCNC: 18 U/L (ref 9–39)
BASOPHILS # BLD AUTO: 0.02 X10*3/UL (ref 0–0.1)
BASOPHILS NFR BLD AUTO: 0.7 %
BILIRUB SERPL-MCNC: 0.5 MG/DL (ref 0–1.2)
BUN SERPL-MCNC: 25 MG/DL (ref 6–23)
CALCIUM SERPL-MCNC: 9.4 MG/DL (ref 8.6–10.6)
CHLORIDE SERPL-SCNC: 99 MMOL/L (ref 98–107)
CO2 SERPL-SCNC: 27 MMOL/L (ref 21–32)
CREAT SERPL-MCNC: 0.65 MG/DL (ref 0.6–1.3)
CREAT SERPL-MCNC: 1.01 MG/DL (ref 0.5–1.05)
EGFRCR SERPLBLD CKD-EPI 2021: 59 ML/MIN/1.73M*2
EOSINOPHIL # BLD AUTO: 0.02 X10*3/UL (ref 0–0.4)
EOSINOPHIL NFR BLD AUTO: 0.7 %
ERYTHROCYTE [DISTWIDTH] IN BLOOD BY AUTOMATED COUNT: 14.4 % (ref 11.5–14.5)
GFR SERPL CREATININE-BSD FRML MDRD: >90 ML/MIN/1.73M*2
GLUCOSE SERPL-MCNC: 117 MG/DL (ref 74–99)
HCT VFR BLD AUTO: 37.7 % (ref 36–46)
HGB BLD-MCNC: 12.5 G/DL (ref 12–16)
IMM GRANULOCYTES # BLD AUTO: 0.01 X10*3/UL (ref 0–0.5)
IMM GRANULOCYTES NFR BLD AUTO: 0.3 % (ref 0–0.9)
LYMPHOCYTES # BLD AUTO: 0.96 X10*3/UL (ref 0.8–3)
LYMPHOCYTES NFR BLD AUTO: 32.5 %
MAGNESIUM SERPL-MCNC: 2.11 MG/DL (ref 1.6–2.4)
MCH RBC QN AUTO: 36.3 PG (ref 26–34)
MCHC RBC AUTO-ENTMCNC: 33.2 G/DL (ref 32–36)
MCV RBC AUTO: 110 FL (ref 80–100)
MONOCYTES # BLD AUTO: 0.39 X10*3/UL (ref 0.05–0.8)
MONOCYTES NFR BLD AUTO: 13.2 %
NEUTROPHILS # BLD AUTO: 1.55 X10*3/UL (ref 1.6–5.5)
NEUTROPHILS NFR BLD AUTO: 52.6 %
NRBC BLD-RTO: ABNORMAL /100{WBCS}
PHOSPHATE SERPL-MCNC: 3.8 MG/DL (ref 2.5–4.9)
PLATELET # BLD AUTO: 176 X10*3/UL (ref 150–450)
POTASSIUM SERPL-SCNC: 4.1 MMOL/L (ref 3.5–5.3)
PROT SERPL-MCNC: 7 G/DL (ref 6.4–8.2)
RBC # BLD AUTO: 3.44 X10*6/UL (ref 4–5.2)
SODIUM SERPL-SCNC: 135 MMOL/L (ref 136–145)
WBC # BLD AUTO: 3 X10*3/UL (ref 4.4–11.3)

## 2024-08-02 PROCEDURE — 74177 CT ABD & PELVIS W/CONTRAST: CPT

## 2024-08-02 PROCEDURE — 82565 ASSAY OF CREATININE: CPT

## 2024-08-02 PROCEDURE — 84100 ASSAY OF PHOSPHORUS: CPT

## 2024-08-02 PROCEDURE — 2550000001 HC RX 255 CONTRASTS: Performed by: INTERNAL MEDICINE

## 2024-08-02 PROCEDURE — 36415 COLL VENOUS BLD VENIPUNCTURE: CPT

## 2024-08-02 PROCEDURE — 85025 COMPLETE CBC W/AUTO DIFF WBC: CPT

## 2024-08-02 PROCEDURE — 83735 ASSAY OF MAGNESIUM: CPT

## 2024-08-05 ENCOUNTER — APPOINTMENT (OUTPATIENT)
Dept: INTEGRATIVE MEDICINE | Facility: CLINIC | Age: 74
End: 2024-08-05
Payer: MEDICARE

## 2024-08-05 DIAGNOSIS — G62.89 OTHER POLYNEUROPATHY: ICD-10-CM

## 2024-08-05 DIAGNOSIS — Z17.0 MALIGNANT NEOPLASM OF BREAST IN FEMALE, ESTROGEN RECEPTOR POSITIVE, UNSPECIFIED LATERALITY, UNSPECIFIED SITE OF BREAST (MULTI): ICD-10-CM

## 2024-08-05 DIAGNOSIS — M25.611 DECREASED ROM OF RIGHT SHOULDER: Primary | ICD-10-CM

## 2024-08-05 DIAGNOSIS — C50.919 MALIGNANT NEOPLASM OF BREAST IN FEMALE, ESTROGEN RECEPTOR POSITIVE, UNSPECIFIED LATERALITY, UNSPECIFIED SITE OF BREAST (MULTI): ICD-10-CM

## 2024-08-05 DIAGNOSIS — M79.10 MYALGIA: ICD-10-CM

## 2024-08-05 PROCEDURE — 97140 MANUAL THERAPY 1/> REGIONS: CPT | Performed by: HOSPITALIST

## 2024-08-05 ASSESSMENT — PAIN SCALES - GENERAL: PAINLEVEL_OUTOF10: 5 - MODERATE PAIN

## 2024-08-05 NOTE — PROGRESS NOTES
Massage Therapy Visit:     Gracy Espino was referred by Dr. Cleveland.    Condition of Client Subjective :  Patient ID: Gracy Espino is a 73 y.o. female who presents for a 50 minute Gabino Therapy.  Patient is being treated for breast cancer.  Patient has limited ROM in the right shoulder.  She has swelling in that arm.  I did Gabino Lymph Drainage (MLD) on the right arm. I explained that this is very light work.  I gave the patient self care tips.  I worked on her supine.  Patient has had problem with the right shoulder for a while.  She did notice a slight improvement with the ROM in the right arm.  Patient does have numbness in her hands and feet.  I checked in with her often.      Session Information  Visit Type: New patient  Description of present complaint: Muscle tension, Range of motion (ROM), Discomfort        Objective   Pre-treatment Assessment  Arrival Mode: Ambulatory  Pain Score: 6  Anxiety Level (0-10): 0  Stress Level (0-10): 0  Coping Level (0-10): 10  Depression Level (0-10): 0  Fatigue Level (0-10): 1  Nausea Level (0-10): 0  Wellbeing Level (0-10): 2        Actions Assessment/Plan :  Provider reviewed plan for the massage session, precautions and contraindications. Patient/guardian/hospital staff has given consent to treat with full understanding of what to expect during the session. Before massage therapy began, provider explained to the patient to communicate at any time if the pressure was causing discomfort past their tolerance level. Patient agreed to advise therapist.    Massage Treatment  Patient Position: Table  Positioning Assistance: Pillow(s)/bolster under knees while supine  Massage Technique: Relaxation massage, Lymphatic drainiage  Pressure Scale: 1 - Light pressure, 2 - Mild pressure    Response:  Post-treatment Assessment  Patient Noted Improvement of the Following Symptoms: Muscle tension, ROM  0-10 (Numeric) Pain Score: 5 - Moderate pain  Anxiety Level (0-10): 0  Stress Level  (0-10): 0  Coping Level (0-10): 10  Depression Level (0-10): 0  Fatigue Level (0-10): 1  Nausea Level (0-10): 0  Wellbeing Level (0-10): 2    Evaluation:   Patient will follow up as needed.

## 2024-08-07 ENCOUNTER — OFFICE VISIT (OUTPATIENT)
Dept: HEMATOLOGY/ONCOLOGY | Facility: CLINIC | Age: 74
End: 2024-08-07
Payer: MEDICARE

## 2024-08-07 ENCOUNTER — APPOINTMENT (OUTPATIENT)
Dept: HEMATOLOGY/ONCOLOGY | Facility: CLINIC | Age: 74
End: 2024-08-07
Payer: MEDICARE

## 2024-08-07 ENCOUNTER — INFUSION (OUTPATIENT)
Dept: HEMATOLOGY/ONCOLOGY | Facility: CLINIC | Age: 74
End: 2024-08-07
Payer: MEDICARE

## 2024-08-07 VITALS
HEART RATE: 77 BPM | DIASTOLIC BLOOD PRESSURE: 75 MMHG | BODY MASS INDEX: 26.5 KG/M2 | TEMPERATURE: 98.1 F | WEIGHT: 182.43 LBS | SYSTOLIC BLOOD PRESSURE: 123 MMHG | OXYGEN SATURATION: 99 %

## 2024-08-07 DIAGNOSIS — Z17.0 MALIGNANT NEOPLASM OF BREAST IN FEMALE, ESTROGEN RECEPTOR POSITIVE, UNSPECIFIED LATERALITY, UNSPECIFIED SITE OF BREAST (MULTI): Primary | ICD-10-CM

## 2024-08-07 DIAGNOSIS — C50.911 MALIGNANT NEOPLASM OF RIGHT BREAST IN FEMALE, ESTROGEN RECEPTOR POSITIVE, UNSPECIFIED SITE OF BREAST (MULTI): ICD-10-CM

## 2024-08-07 DIAGNOSIS — Z17.0 MALIGNANT NEOPLASM OF BREAST IN FEMALE, ESTROGEN RECEPTOR POSITIVE, UNSPECIFIED LATERALITY, UNSPECIFIED SITE OF BREAST (MULTI): ICD-10-CM

## 2024-08-07 DIAGNOSIS — C50.912 CARCINOMA OF LEFT BREAST METASTATIC TO LUNG (MULTI): ICD-10-CM

## 2024-08-07 DIAGNOSIS — C50.919 MALIGNANT NEOPLASM OF BREAST IN FEMALE, ESTROGEN RECEPTOR POSITIVE, UNSPECIFIED LATERALITY, UNSPECIFIED SITE OF BREAST (MULTI): ICD-10-CM

## 2024-08-07 DIAGNOSIS — N18.2 STAGE 2 CHRONIC KIDNEY DISEASE: ICD-10-CM

## 2024-08-07 DIAGNOSIS — C78.02 CARCINOMA OF LEFT BREAST METASTATIC TO LUNG (MULTI): ICD-10-CM

## 2024-08-07 DIAGNOSIS — Z17.0 MALIGNANT NEOPLASM OF RIGHT BREAST IN FEMALE, ESTROGEN RECEPTOR POSITIVE, UNSPECIFIED SITE OF BREAST (MULTI): ICD-10-CM

## 2024-08-07 DIAGNOSIS — C50.919 MALIGNANT NEOPLASM OF BREAST IN FEMALE, ESTROGEN RECEPTOR POSITIVE, UNSPECIFIED LATERALITY, UNSPECIFIED SITE OF BREAST (MULTI): Primary | ICD-10-CM

## 2024-08-07 PROCEDURE — 99215 OFFICE O/P EST HI 40 MIN: CPT | Performed by: INTERNAL MEDICINE

## 2024-08-07 PROCEDURE — 96365 THER/PROPH/DIAG IV INF INIT: CPT | Mod: INF

## 2024-08-07 PROCEDURE — 1160F RVW MEDS BY RX/DR IN RCRD: CPT | Performed by: INTERNAL MEDICINE

## 2024-08-07 PROCEDURE — 2500000004 HC RX 250 GENERAL PHARMACY W/ HCPCS (ALT 636 FOR OP/ED)

## 2024-08-07 PROCEDURE — 1159F MED LIST DOCD IN RCRD: CPT | Performed by: INTERNAL MEDICINE

## 2024-08-07 PROCEDURE — 1125F AMNT PAIN NOTED PAIN PRSNT: CPT | Performed by: INTERNAL MEDICINE

## 2024-08-07 PROCEDURE — 1157F ADVNC CARE PLAN IN RCRD: CPT | Performed by: INTERNAL MEDICINE

## 2024-08-07 RX ORDER — FAMOTIDINE 10 MG/ML
20 INJECTION INTRAVENOUS ONCE AS NEEDED
OUTPATIENT
Start: 2024-10-23

## 2024-08-07 RX ORDER — ALBUTEROL SULFATE 0.83 MG/ML
3 SOLUTION RESPIRATORY (INHALATION) AS NEEDED
OUTPATIENT
Start: 2024-10-23

## 2024-08-07 RX ORDER — DIPHENHYDRAMINE HYDROCHLORIDE 50 MG/ML
50 INJECTION INTRAMUSCULAR; INTRAVENOUS AS NEEDED
OUTPATIENT
Start: 2024-10-23

## 2024-08-07 RX ORDER — EPINEPHRINE 0.3 MG/.3ML
0.3 INJECTION SUBCUTANEOUS EVERY 5 MIN PRN
OUTPATIENT
Start: 2024-10-23

## 2024-08-07 ASSESSMENT — PAIN SCALES - GENERAL: PAINLEVEL: 6

## 2024-08-07 NOTE — PATIENT INSTRUCTIONS
Please call us at [phone] option 5 then option 2 with any questions or concerns     Follow-up in about 3 months with me and bloodwork and CT scan (1st wk of November)  Zometa also

## 2024-08-09 ENCOUNTER — APPOINTMENT (OUTPATIENT)
Dept: HEMATOLOGY/ONCOLOGY | Facility: HOSPITAL | Age: 74
End: 2024-08-09
Payer: MEDICARE

## 2024-08-09 ASSESSMENT — ENCOUNTER SYMPTOMS
FREQUENCY: 0
APPETITE CHANGE: 0
ADENOPATHY: 0
FEVER: 0
SLEEP DISTURBANCE: 0
SCLERAL ICTERUS: 0
CONSTITUTIONAL NEGATIVE: 1
BACK PAIN: 0
EXTREMITY WEAKNESS: 0
LEG SWELLING: 0
MYALGIAS: 0
ARTHRALGIAS: 0
DIAPHORESIS: 0
FATIGUE: 0
DIARRHEA: 0
EYE PROBLEMS: 0
CHILLS: 0
PALPITATIONS: 0
ABDOMINAL DISTENTION: 0
DIFFICULTY URINATING: 0
CARDIOVASCULAR NEGATIVE: 1
BLOOD IN STOOL: 0
WHEEZING: 0
BRUISES/BLEEDS EASILY: 0
EYES NEGATIVE: 1
HEADACHES: 0
CHEST TIGHTNESS: 0
DEPRESSION: 0
HEMOPTYSIS: 0
DYSURIA: 0
NERVOUS/ANXIOUS: 0
NUMBNESS: 0
HOT FLASHES: 0
HEMATURIA: 0
CONSTIPATION: 0
NAUSEA: 0
SEIZURES: 0
ABDOMINAL PAIN: 0
RESPIRATORY NEGATIVE: 1
DIZZINESS: 0
SHORTNESS OF BREATH: 0
COUGH: 0
CONFUSION: 0

## 2024-08-09 NOTE — PROGRESS NOTES
Breast Medical Oncology Clinic  Location: [facility]      BREAST CANCER DIAGNOSIS  Carcinoma of breast metastatic to bone (Multi), Clinical: cM1b    Carcinoma of left breast metastatic to lung (Multi), Clinical: Stage IV (pM1)       ONCOLOGIC HISTORY (from Dr Garber's note 5/16/24):  Diagnostic/Therapeutic History:  Diagnosis: Recurrent/metastatic breast cancer.  ER >95% ME 10% Her2-negative (0 IHC).  Sites of Disease: LN's, pleura, bone.  NGS: PIK3CA     -2007: Right T1c N1a ER/ME+ Her2- breast cancer.  -TAC x6 cycles.  -RT completed 3/2008.  -Completed 5 years of anastrozole 4/2013. Two more years, stopped 6/2015.  -1/7/2023: Presented to ER with dyspnea and right lymphedema. CTA showed large right-sided pleural effusion, small left pleural effusion, nodular opacities in MARITZA and lingual, right axillary soft tissue lesion (3.7 x 4.1 cm) with nodular opacities in  subcutaneous tissues of right chest wall.  -1/13/23: PET/CT showed hypermetabolic disease in multiple supraclavicular, axillary, mediastinal, hilar, periaortic LN’s. Osseous lesions in axial and appendicular skeleton, pleural nodularities in right hemithorax, focus of activity in right  posterior triceps muscle.  -1/13/23: RUE MRI confirmed right masslike axillary LAD with mass effect on the neurovascular bundle without occlusion.  -1/20/23: Right axillary biopsy confirmed metastatic IDC, ER >95% ME 10% Her2-negative (0 by IHC). NGS testing showed PIK3CA  mutation.  -1/2023: Letrozole initiated.  -2/7/23: Palbociclib initiated.  -5/11/23: Zoledronic acid initiated (q12 weeks).    CURRENT THERAPY  Letrozole/palbociclib since 2/2023    HISTORY OF PRESENT ILLNESS    Gracy Espino is a 73 y.o. woman previously cared for by Dr Garber and her 1st visit with me. Overall tolerating treatment well. Recent CT from 8/2/24 showed response in right axilla and stable disease in the bone.            Review of Systems   Constitutional: Negative.  Negative for appetite  change, chills, diaphoresis, fatigue and fever.   HENT:  Negative.  Negative for hearing loss and lump/mass.    Eyes: Negative.  Negative for eye problems and icterus.   Respiratory: Negative.  Negative for chest tightness, cough, hemoptysis, shortness of breath and wheezing.    Cardiovascular: Negative.  Negative for chest pain, leg swelling and palpitations.   Gastrointestinal:  Negative for abdominal distention, abdominal pain, blood in stool, constipation, diarrhea and nausea.   Endocrine: Negative for hot flashes.   Genitourinary:  Negative for bladder incontinence, difficulty urinating, dyspareunia, dysuria, frequency and hematuria.    Musculoskeletal:  Negative for arthralgias, back pain, gait problem and myalgias.   Neurological:  Negative for dizziness, extremity weakness, gait problem, headaches, numbness and seizures.   Hematological:  Negative for adenopathy. Does not bruise/bleed easily.   Psychiatric/Behavioral:  Negative for confusion, depression and sleep disturbance. The patient is not nervous/anxious.    All other systems reviewed and are negative.        Past Medical History:  has a past medical history of Breast cancer (Multi), Disease of thyroid gland, Hypertension, Lymphedema, and Neuropathy.  Surgical History:   has no past surgical history on file.  Social History:   reports that she has never smoked. She has never used smokeless tobacco. She reports current alcohol use of about 2.0 standard drinks of alcohol per week. She reports that she does not use drugs.  Family History:  No family history on file.  Family Oncology History:  Cancer-related family history is not on file.      OBJECTIVE    VS / Pain:  /75 (BP Location: Left arm, Patient Position: Sitting, BP Cuff Size: Adult)   Pulse 77   Temp 36.7 °C (98.1 °F) (Temporal)   Wt 82.7 kg (182 lb 6.9 oz)   SpO2 99%   BMI 26.50 kg/m²   BSA: 2.01 meters squared   Pain Scale: 2    Performance Status:  The ECOG performance scale today  is ECO- Restricted in physically strenuous activity.  Carries out light duty.    Physical Exam  Constitutional:       General: She is not in acute distress.     Appearance: She is not ill-appearing, toxic-appearing or diaphoretic.   HENT:      Nose: No congestion or rhinorrhea.      Mouth/Throat:      Pharynx: No posterior oropharyngeal erythema.   Cardiovascular:      Rate and Rhythm: Normal rate and regular rhythm.      Pulses: Normal pulses.      Heart sounds: Normal heart sounds. No murmur heard.     No gallop.   Pulmonary:      Breath sounds: Normal breath sounds.   Abdominal:      General: There is no distension.      Palpations: There is no mass.      Tenderness: There is no abdominal tenderness.   Musculoskeletal:         General: No swelling.      Cervical back: No rigidity.      Right lower leg: No edema.      Left lower leg: No edema.   Lymphadenopathy:      Cervical: No cervical adenopathy.   Skin:     General: Skin is warm.      Coloration: Skin is not cyanotic.      Findings: No bruising, ecchymosis or erythema.   Neurological:      General: No focal deficit present.      Mental Status: She is oriented to person, place, and time.      Cranial Nerves: Cranial nerves 2-12 are intact.      Motor: Motor function is intact.   Psychiatric:         Attention and Perception: Attention and perception normal.         Mood and Affect: Mood and affect normal.         Behavior: Behavior normal.         Thought Content: Thought content normal.         Judgment: Judgment normal.           Diagnostic Results   === 24 ===    CT CHEST ABDOMEN PELVIS W IV CONTRAST    - Impression -  CHEST:  1.  Stable 16 mm right apical subsolid nodule. Otherwise, no new  suspicious nodule.  2. Stable moderate right pleural effusion with atelectasis.  3. Interval decrease right axillary soft tissue mass, now 4.3 x 2.5  cm, previously 5.6 x 2.3 cm.  4. Stable osseous sclerotic lesions.  5. Other stable findings as described  above.    ABDOMEN-PELVIS:  1.  Stable osseous sclerotic lesions. No new lesion.  2. Otherwise, no evidence of metastatic disease in the abdomen and  pelvis.  3. Unchanged soft tissue attenuation left interpolar region renal  lesion.      Signed by: Gigi Rea 8/4/2024 12:17 PM  Dictation workstation:   [id number]   === 01/13/23 ===    MR HUMERUS    - Impression -  Long segment marrow replacement lesion with endosteal scalloping and  cortical involvement involving the distal humeral diaphysis  consistent with osseous metastatic disease. Significant periosteal  reaction but no evidence of pathologic fracture.    Smaller osseous metastasis in the proximal humeral diaphysis.    Masslike adenopathy in the right axilla causing some mass effect on  the neurovascular bundle but without MR findings to suggest vascular  occlusion. This area is, however, limited due to artifact from the  patient's reversed total shoulder arthroplasty.    Nonspecific generalized subcutaneous edema of the right upper  extremity with some skin thickening. Findings are nonspecific but  favor lymphedema. A component of cellulitis a consideration.    Reverse right total shoulder arthroplasty without gross evidence of  complication.   No results found for this or any previous visit from the past 365 days.     No images are attached to the encounter.    LABORATORY/PATHOLOGY DATA    Hospital Outpatient Visit on 08/02/2024   Component Date Value Ref Range Status    POCT Creatinine 08/02/2024 0.65  0.60 - 1.30 mg/dL Final    POCT eGFR 08/02/2024 >90  >=60 mL/min/1.73m*2 Final   Lab on 08/02/2024   Component Date Value Ref Range Status    Glucose 08/02/2024 117 (H)  74 - 99 mg/dL Final    Sodium 08/02/2024 135 (L)  136 - 145 mmol/L Final    Potassium 08/02/2024 4.1  3.5 - 5.3 mmol/L Final    Chloride 08/02/2024 99  98 - 107 mmol/L Final    Bicarbonate 08/02/2024 27  21 - 32 mmol/L Final    Anion Gap 08/02/2024 13  10 - 20 mmol/L Final    Urea  Nitrogen 08/02/2024 25 (H)  6 - 23 mg/dL Final    Creatinine 08/02/2024 1.01  0.50 - 1.05 mg/dL Final    eGFR 08/02/2024 59 (L)  >60 mL/min/1.73m*2 Final    Calcium 08/02/2024 9.4  8.6 - 10.6 mg/dL Final    Albumin 08/02/2024 3.9  3.4 - 5.0 g/dL Final    Alkaline Phosphatase 08/02/2024 81  33 - 136 U/L Final    Total Protein 08/02/2024 7.0  6.4 - 8.2 g/dL Final    AST 08/02/2024 18  9 - 39 U/L Final    Bilirubin, Total 08/02/2024 0.5  0.0 - 1.2 mg/dL Final    ALT 08/02/2024 13  7 - 45 U/L Final    Magnesium 08/02/2024 2.11  1.60 - 2.40 mg/dL Final    Phosphorus 08/02/2024 3.8  2.5 - 4.9 mg/dL Final    WBC 08/02/2024 3.0 (L)  4.4 - 11.3 x10*3/uL Final    nRBC 08/02/2024    Final    RBC 08/02/2024 3.44 (L)  4.00 - 5.20 x10*6/uL Final    Hemoglobin 08/02/2024 12.5  12.0 - 16.0 g/dL Final    Hematocrit 08/02/2024 37.7  36.0 - 46.0 % Final    MCV 08/02/2024 110 (H)  80 - 100 fL Final    MCH 08/02/2024 36.3 (H)  26.0 - 34.0 pg Final    MCHC 08/02/2024 33.2  32.0 - 36.0 g/dL Final    RDW 08/02/2024 14.4  11.5 - 14.5 % Final    Platelets 08/02/2024 176  150 - 450 x10*3/uL Final    Neutrophils % 08/02/2024 52.6  40.0 - 80.0 % Final    Immature Granulocytes %, Automated 08/02/2024 0.3  0.0 - 0.9 % Final    Lymphocytes % 08/02/2024 32.5  13.0 - 44.0 % Final    Monocytes % 08/02/2024 13.2  2.0 - 10.0 % Final    Eosinophils % 08/02/2024 0.7  0.0 - 6.0 % Final    Basophils % 08/02/2024 0.7  0.0 - 2.0 % Final    Neutrophils Absolute 08/02/2024 1.55 (L)  1.60 - 5.50 x10*3/uL Final    Immature Granulocytes Absolute, Au* 08/02/2024 0.01  0.00 - 0.50 x10*3/uL Final    Lymphocytes Absolute 08/02/2024 0.96  0.80 - 3.00 x10*3/uL Final    Monocytes Absolute 08/02/2024 0.39  0.05 - 0.80 x10*3/uL Final    Eosinophils Absolute 08/02/2024 0.02  0.00 - 0.40 x10*3/uL Final    Basophils Absolute 08/02/2024 0.02  0.00 - 0.10 x10*3/uL Final      Lab Results   Component Value Date    LABCA2 24.8 02/05/2024    LABCA2 34.0 11/10/2023              IMPRESSION/PLAN    Metastatic ER+/HER2- breast cancer. Continued response to therapy. Will continue present treatment. RTC in about 3 months with CT scans, bloodwork. Continue zometa q3 months    2. CKD. Monitor    We discussed the clinical significance of diagnosis, goals of care and treatment plan in detail.     I personally spent over half of a total 35 minutes face to face with the patient in counseling and discussion and/or coordination of care as described above.         -------------------------------------------------------------------------------------------------------------------------------  Jamar Betancourt MD  Director of Breast Cancer Medical Oncology Research Program   [facility]  Professor of Medicine  [facility] [address]  [place] [address]  Phone: [phone]  [email or web address]

## 2024-08-12 ENCOUNTER — APPOINTMENT (OUTPATIENT)
Dept: INTEGRATIVE MEDICINE | Facility: CLINIC | Age: 74
End: 2024-08-12

## 2024-08-12 DIAGNOSIS — G62.9 PERIPHERAL NEUROPATHY: Primary | ICD-10-CM

## 2024-08-12 PROCEDURE — 97139 UNLISTED THERAPEUTIC PX: CPT | Performed by: ACUPUNCTURIST

## 2024-08-12 ASSESSMENT — PAIN SCALES - GENERAL: PAINLEVEL_OUTOF10: 5 - MODERATE PAIN

## 2024-08-16 ENCOUNTER — ONCOLOGY MEDICATION OUTREACH (OUTPATIENT)
Dept: HEMATOLOGY/ONCOLOGY | Facility: CLINIC | Age: 74
End: 2024-08-16
Payer: MEDICARE

## 2024-08-19 ENCOUNTER — APPOINTMENT (OUTPATIENT)
Dept: INTEGRATIVE MEDICINE | Facility: CLINIC | Age: 74
End: 2024-08-19

## 2024-08-19 DIAGNOSIS — G62.9 PERIPHERAL NEUROPATHY: ICD-10-CM

## 2024-08-19 DIAGNOSIS — M25.611 DECREASED ROM OF RIGHT SHOULDER: Primary | ICD-10-CM

## 2024-08-19 PROCEDURE — 97139 UNLISTED THERAPEUTIC PX: CPT | Performed by: ACUPUNCTURIST

## 2024-08-19 ASSESSMENT — PAIN SCALES - GENERAL: PAINLEVEL_OUTOF10: 5 - MODERATE PAIN

## 2024-08-19 NOTE — PROGRESS NOTES
Acupuncture Visit:     Subjective   Patient ID: Gracy Espino is a 73 y.o. female who presents for No chief complaint on file.  Pt reported improvement in ROM of right shoulder, she reports minimal results with CIPN        Session Information  Is this acupuncture treatment being billed to the patient's insurance company: No  Visit Type: Follow-up visit    Pre-treatment Assessment  Pain Score: 5 - Moderate pain  Anxiety Level (0-10): 0  Stress Level (0-10): 0  Coping Level (0-10): 10  Depression Level (0-10): 0  Fatigue Level (0-10): 1  Nausea Level (0-10): 0  Wellbeing Level (0-10): 9    Review of Systems         Provider reviewed plan for the acupuncture session, precautions and contraindications. Patient/guardian/hospital staff has given consent to treat with full understanding of what to expect during the session. Before acupuncture began, provider explained to the patient to communicate at any time if the procedure was causing discomfort past their tolerance level. Patient agreed to advise acupuncturist. The acupuncturist counseled the patient on the risks of acupuncture treatment including pain, infection, bleeding, and no relief of pain. The patient was positioned comfortably. There was no evidence of infection at the site of needle insertions.    Objective   Physical Exam         Treatment Plan  Treatment Goals: Pain management    Acupuncture Treatment  Patient Position: Seated and reclining  Needle Guage: 40 guage /.16/ Red seirin  Body Points - Bilateral: lr 3, li 4, st 36, k 9, 27, li 15 immune x2  Body Points - Right: sj 14, shoulder pt  Needle Count In: 18  Needle Count Out: 18  Needle Retention Time (min): 30 minutes  Total Face to Face Time (min): 25 minutes         Post-treatment Assessment  0-10 (Numeric) Pain Score: 5 - Moderate pain  Anxiety Level (0-10): 0  Stress Level (0-10): 0  Coping Level (0-10): 10  Depression Level (0-10): 0  Fatigue Level (0-10): 2  Nausea Level (0-10): 0  Wellbeing  Level (0-10): 9    Assessment/Plan

## 2024-08-26 ENCOUNTER — TELEPHONE (OUTPATIENT)
Dept: HEMATOLOGY/ONCOLOGY | Facility: HOSPITAL | Age: 74
End: 2024-08-26

## 2024-08-26 ENCOUNTER — APPOINTMENT (OUTPATIENT)
Dept: INTEGRATIVE MEDICINE | Facility: CLINIC | Age: 74
End: 2024-08-26

## 2024-08-26 DIAGNOSIS — Z51.5 PALLIATIVE CARE ENCOUNTER: ICD-10-CM

## 2024-08-26 DIAGNOSIS — G89.3 CANCER RELATED PAIN: ICD-10-CM

## 2024-08-26 DIAGNOSIS — G62.9 PERIPHERAL NEUROPATHY: Primary | ICD-10-CM

## 2024-08-26 DIAGNOSIS — M25.611 DECREASED ROM OF RIGHT SHOULDER: ICD-10-CM

## 2024-08-26 PROCEDURE — 97139 UNLISTED THERAPEUTIC PX: CPT | Performed by: ACUPUNCTURIST

## 2024-08-26 RX ORDER — TRAMADOL HYDROCHLORIDE 50 MG/1
50 TABLET ORAL EVERY 8 HOURS PRN
Qty: 90 TABLET | Refills: 0 | Status: SHIPPED | OUTPATIENT
Start: 2024-08-26 | End: 2024-09-25

## 2024-08-26 RX ORDER — PREGABALIN 50 MG/1
50 CAPSULE ORAL 3 TIMES DAILY
Qty: 270 CAPSULE | Refills: 0 | Status: SHIPPED | OUTPATIENT
Start: 2024-08-26 | End: 2024-11-24

## 2024-08-26 ASSESSMENT — PAIN SCALES - GENERAL: PAINLEVEL_OUTOF10: 4

## 2024-08-26 NOTE — PROGRESS NOTES
Acupuncture Visit:     Subjective   Patient ID: Gracy Espino is a 73 y.o. female who presents for No chief complaint on file.  Pt reported that shoulder is improving but feels tight today.  CIPN in hands  and feet described as numbness but no longer whole foot        Session Information  Is this acupuncture treatment being billed to the patient's insurance company: No  Visit Type: Follow-up visit    Pre-treatment Assessment  Pain Score: 6  Anxiety Level (0-10): 0  Stress Level (0-10): 0  Coping Level (0-10): 9  Depression Level (0-10): 0  Fatigue Level (0-10): 1  Nausea Level (0-10): 0  Wellbeing Level (0-10): 8    Review of Systems         Provider reviewed plan for the acupuncture session, precautions and contraindications. Patient/guardian/hospital staff has given consent to treat with full understanding of what to expect during the session. Before acupuncture began, provider explained to the patient to communicate at any time if the procedure was causing discomfort past their tolerance level. Patient agreed to advise acupuncturist. The acupuncturist counseled the patient on the risks of acupuncture treatment including pain, infection, bleeding, and no relief of pain. The patient was positioned comfortably. There was no evidence of infection at the site of needle insertions.    Objective   Physical Exam    Acupuncture Physical Exam  Tongue Color: Dark purple, Pale edges  Tongue Shape: Scalloped edges  Pulse: Weak    Treatment Plan  Treatment Goals: Pain management    Acupuncture Treatment  Patient Position: Seated and reclining  Needle Guage: 40 guage /.16/ Red seirin, 42 guage /.14/ Lime green seirin, 38 guage /.18/ Yellow seirin  Body Points - Bilateral: lr 3, li4, baxie, st 36  Body Points - Right: k 9, li 15, sj 14 dionne 5, shoulder pt, ant deltoid  Needle Count In: 19  Needle Count Out: 19  Needle Retention Time (min): 30 minutes         Post-treatment Assessment  0-10 (Numeric) Pain Score: 4  Anxiety Level  (0-10): 0  Stress Level (0-10): 0  Coping Level (0-10): 10  Depression Level (0-10): 0  Fatigue Level (0-10): 2  Nausea Level (0-10): 0  Wellbeing Level (0-10): 9    Assessment/Plan

## 2024-09-06 ENCOUNTER — OFFICE VISIT (OUTPATIENT)
Dept: PALLIATIVE MEDICINE | Facility: CLINIC | Age: 74
End: 2024-09-06
Payer: MEDICARE

## 2024-09-06 VITALS
SYSTOLIC BLOOD PRESSURE: 122 MMHG | TEMPERATURE: 97.2 F | BODY MASS INDEX: 26.41 KG/M2 | DIASTOLIC BLOOD PRESSURE: 72 MMHG | WEIGHT: 181.77 LBS | HEART RATE: 69 BPM | RESPIRATION RATE: 18 BRPM | OXYGEN SATURATION: 99 %

## 2024-09-06 DIAGNOSIS — G89.3 CANCER RELATED PAIN: ICD-10-CM

## 2024-09-06 DIAGNOSIS — G62.0 PERIPHERAL NEUROPATHY DUE TO CHEMOTHERAPY (MULTI): ICD-10-CM

## 2024-09-06 DIAGNOSIS — C79.51 CARCINOMA OF BREAST METASTATIC TO BONE, UNSPECIFIED LATERALITY (MULTI): Primary | ICD-10-CM

## 2024-09-06 DIAGNOSIS — Z51.5 PALLIATIVE CARE ENCOUNTER: ICD-10-CM

## 2024-09-06 DIAGNOSIS — K59.00 CONSTIPATION, UNSPECIFIED CONSTIPATION TYPE: ICD-10-CM

## 2024-09-06 DIAGNOSIS — C50.919 CARCINOMA OF BREAST METASTATIC TO BONE, UNSPECIFIED LATERALITY (MULTI): Primary | ICD-10-CM

## 2024-09-06 DIAGNOSIS — T45.1X5A PERIPHERAL NEUROPATHY DUE TO CHEMOTHERAPY (MULTI): ICD-10-CM

## 2024-09-06 PROCEDURE — 99214 OFFICE O/P EST MOD 30 MIN: CPT

## 2024-09-06 PROCEDURE — 1159F MED LIST DOCD IN RCRD: CPT

## 2024-09-06 PROCEDURE — 1036F TOBACCO NON-USER: CPT

## 2024-09-06 PROCEDURE — 1157F ADVNC CARE PLAN IN RCRD: CPT

## 2024-09-06 PROCEDURE — 1126F AMNT PAIN NOTED NONE PRSNT: CPT

## 2024-09-06 ASSESSMENT — PAIN SCALES - GENERAL: PAINLEVEL: 0-NO PAIN

## 2024-09-06 NOTE — PROGRESS NOTES
"  SUPPORTIVE AND PALLIATIVE ONCOLOGY OUTPATIENT FOLLOW-UP      SERVICE DATE: 9/6/2024    Subjective   HISTORY OF PRESENT ILLNESS: Gracy Espino is a 73 y.o. female who presents with who presents with  hx. of recurrent metastatic breast cancer (other sites: LN, pleura, bone). She is currently on Letrozole + palbociclib.        Pain Assessment:  Pain Score: 4  Location:  bilateral hands and feet - primarily in left hand  Education:  current pain regimen     Symptom Assessment:  Pain:somewhat- lymphedema, CIPN- related  Headache: none  Dizziness:none  Lack of energy: a little  Difficulty sleeping: none  Worrying: none  Anxiety: none  Depression: none  Shortness of breath: none  Lack of appetite: none   Nausea: none  Vomiting: none  Constipation: none  Diarrhea: none  Numbness or tingling in hands/feet/other: somewhat- stable  Weight loss: none    Information obtained from: chart review and interview of patient  ______________________________________________________________________        Objective   No results found for this or any previous visit (from the past 96 hour(s)).             PHYSICAL EXAMINATION   Vital Signs:   Vital signs reviewed      2/7/2024    10:39 AM 2/8/2024     9:34 AM 5/15/2024    10:07 AM 5/16/2024     9:51 AM 7/12/2024    10:39 AM 8/7/2024    11:17 AM 9/6/2024    10:07 AM   Vitals   Systolic 113 113 122 123 120 123 122   Diastolic 62 57 66 69 74 75 72   Heart Rate 76 65 68 67 72 77 69   Temp 36.2 °C (97.2 °F) 36 °C (96.8 °F) 36.1 °C (97 °F) 36.4 °C (97.5 °F) 35.8 °C (96.4 °F) 36.7 °C (98.1 °F) 36.2 °C (97.2 °F)   Resp 17 16 16 20 18  18   Height (in)  1.767 m (5' 9.57\")  1.767 m (5' 9.57\")      Weight (lb) 167.77 169.75 177.91 177.25 177.03 182.43 181.77   BMI 24.37 kg/m2 24.66 kg/m2 25.85 kg/m2 25.75 kg/m2 25.72 kg/m2 26.5 kg/m2 26.41 kg/m2   BSA (m2) 1.93 m2 1.94 m2 1.99 m2 1.99 m2 1.99 m2 2.02 m2 2.01 m2   Visit Report Report Report Report Report Report Report Report          Physical " Exam  Constitutional:       Appearance: She is normal weight.   HENT:      Head: Normocephalic.      Mouth/Throat:      Mouth: Mucous membranes are moist.      Pharynx: Oropharynx is clear.   Eyes:      Conjunctiva/sclera: Conjunctivae normal.      Pupils: Pupils are equal, round, and reactive to light.   Pulmonary:      Effort: Pulmonary effort is normal.   Abdominal:      General: Abdomen is flat.   Musculoskeletal:         General: Swelling present. Normal range of motion.      Cervical back: Normal range of motion.      Comments: Right arm lymphedema      Skin:     General: Skin is warm and dry.   Neurological:      General: No focal deficit present.      Mental Status: She is alert.   Psychiatric:         Mood and Affect: Mood normal.         Behavior: Behavior normal.       ASSESSMENT/PLAN    Pain  Pain is: cancer related pain  Type: neuropathic; CIPN  Pain control: sub-optimally controlled  Home regimen:   Duloxetine 90mg daily with dinner  Tramadol 50mg q8h as needed (takes ~1x/day to good relief)  Pregabalin 50mg TID   Intolerances/previously tried: Gabapentin (felt sleepy)  Educated that it is okay to occasionally pre-medicate activity with Tramadol, especially with ongoing goal to reduce sedentary behavior.  Encouraged to increase activity- exercise can be beneficial for both CIPN and constipation- has access to a water aerobics club/class in [place], encouraged her to join as this may be tolerable exercise that can feel good on the body  Referral 5/15/24 to Integrative Oncology- started acupuncture (unsure of benefit) and massage (enjoying)    Overdose Risk Score:000    Constipation  At risk for constipation related to opioids,   intermittent constipation  Encouraged to take OTC stool softener daily and call if this is note helpful.  Encouraged adequate activity & hydration to promote bowel motility.     Sleeping Difficulty:  Impaired sleep related to CIPN  Home regimen:  see pain notes     Supportive  Interventions: n/a- will continue to evaluate needs     Supportive and Palliative Oncology encounter:  Spoke with patient and Michael garcia via virtual platform  Emotional support provided  Coordination of care  We will continue to follow and address symptoms as needed     Medical Decision Making/Goals of Care/Advance Care Planning:  Patient's current clinical condition, including diagnosis, prognosis, and management plan, and goals of care were discussed.   Life limiting disease: metastatic malignancy  Family: Supportive   Performance status: Major limitations due to pain  Goals: symptom control and cancer directed therapy     Advance Directives  Existence of Advance Directives:Yes, documentation or copy in medical record  Decision maker: RY is Michael garcia  Code Status: Full code    Next Follow-Up Visit:  Return to clinic in 2 months prior to pt &  traveling back to [place]    Signature and billing  Medical complexity was moderate level due to due to complexity of problems, extensive data review, and high risk of management/treatment.  Time was spent on the following: Prep Time, Time Directly with Patient/Family/Caregiver, Documentation Time. Total time spent: 35minutes      Data  Diagnostic tests and information reviewed for today's visit:  Most recent labs and imaging results, Medications     9/6/24 changes to plan indicated in bold. Continue all other regimens as listed.     Some elements copied from Supportive Oncology note on 7/12/24 , the elements have been updated and all reflect current decision making from today, 9/6/2024.      Plan of Care discussed with: Patient, Family/Significant Other: , and RN    SIGNATURE: ZAIRA Brito    Contact information:  Supportive and Palliative Oncology  Monday-Friday 8 AM-5 PM  Phone:  [phone], press option #5, then option #1.   Or Epic Secure Chat       I, ZAIRA Brito, attest to the above assessment and  plan.

## 2024-09-09 ENCOUNTER — APPOINTMENT (OUTPATIENT)
Dept: INTEGRATIVE MEDICINE | Facility: CLINIC | Age: 74
End: 2024-09-09

## 2024-09-09 ENCOUNTER — APPOINTMENT (OUTPATIENT)
Dept: INTEGRATIVE MEDICINE | Facility: CLINIC | Age: 74
End: 2024-09-09
Payer: MEDICARE

## 2024-09-11 ENCOUNTER — APPOINTMENT (OUTPATIENT)
Dept: INTEGRATIVE MEDICINE | Facility: CLINIC | Age: 74
End: 2024-09-11
Payer: MEDICARE

## 2024-09-11 DIAGNOSIS — G62.89 OTHER POLYNEUROPATHY: ICD-10-CM

## 2024-09-11 DIAGNOSIS — G62.0 DRUG-INDUCED POLYNEUROPATHY (MULTI): ICD-10-CM

## 2024-09-11 DIAGNOSIS — C50.919 MALIGNANT NEOPLASM OF BREAST IN FEMALE, ESTROGEN RECEPTOR POSITIVE, UNSPECIFIED LATERALITY, UNSPECIFIED SITE OF BREAST (MULTI): Primary | ICD-10-CM

## 2024-09-11 DIAGNOSIS — Z17.0 MALIGNANT NEOPLASM OF BREAST IN FEMALE, ESTROGEN RECEPTOR POSITIVE, UNSPECIFIED LATERALITY, UNSPECIFIED SITE OF BREAST (MULTI): Primary | ICD-10-CM

## 2024-09-11 DIAGNOSIS — M79.10 MYALGIA: ICD-10-CM

## 2024-09-11 DIAGNOSIS — M25.611 DECREASED ROM OF RIGHT SHOULDER: ICD-10-CM

## 2024-09-11 PROCEDURE — 97140 MANUAL THERAPY 1/> REGIONS: CPT | Performed by: HOSPITALIST

## 2024-09-11 ASSESSMENT — PAIN SCALES - GENERAL: PAINLEVEL_OUTOF10: 3

## 2024-09-11 NOTE — PROGRESS NOTES
Massage Therapy Visit:     Gracy Espino was referred by Dr. Cleveland.    Condition of Client Subjective :  Patient ID: Gracy Espino is a 73 y.o. female who presents for a 40 minute Gabino Therapy.  Patient is being treated for breast cancer.  Patient stated that she has lymphedema in the right arm.  She wears a compression sleeve.  She has limited ROM in that arm.  I focused on the right arm and shoulder.  I did Gabino Lymph Drainage on that arm.  I worked on the patient supine.  Patient did notice an improvement with her muscle discomfort.  She did notice an improvement with the ROM in that arm.         Session Information  Visit Type: Follow-up visit  Description of present complaint: Muscle tension, Range of motion (ROM)        Objective   Pre-treatment Assessment  Arrival Mode: Ambulatory  Pain Score: 4  Anxiety Level (0-10): 0  Stress Level (0-10): 0  Coping Level (0-10): 9  Depression Level (0-10): 0  Fatigue Level (0-10): 1  Nausea Level (0-10): 0  Wellbeing Level (0-10): 1        Actions Assessment/Plan :  Provider reviewed plan for the massage session, precautions and contraindications. Patient/guardian/hospital staff has given consent to treat with full understanding of what to expect during the session. Before massage therapy began, provider explained to the patient to communicate at any time if the pressure was causing discomfort past their tolerance level. Patient agreed to advise therapist.    Massage Treatment  Patient Position: Table  Positioning Assistance: Pillow(s)/bolster under knees while supine  Massage Technique: Relaxation massage, Lymphatic drainiage  Pressure Scale: 1 - Light pressure, 2 - Mild pressure    Response:  Post-treatment Assessment  Patient Noted Improvement of the Following Symptoms: Muscle tension, ROM  0-10 (Numeric) Pain Score: 3  Anxiety Level (0-10): 0  Stress Level (0-10): 0  Coping Level (0-10): 10  Depression Level (0-10): 0  Fatigue Level (0-10): 1  Nausea Level  (0-10): 0  Wellbeing Level (0-10): 1    Evaluation:   Patient will follow up as needed.

## 2024-09-16 ENCOUNTER — APPOINTMENT (OUTPATIENT)
Dept: INTEGRATIVE MEDICINE | Facility: CLINIC | Age: 74
End: 2024-09-16

## 2024-09-16 ENCOUNTER — APPOINTMENT (OUTPATIENT)
Dept: INTEGRATIVE MEDICINE | Facility: CLINIC | Age: 74
End: 2024-09-16
Payer: MEDICARE

## 2024-09-16 DIAGNOSIS — M25.611 DECREASED ROM OF RIGHT SHOULDER: ICD-10-CM

## 2024-09-16 DIAGNOSIS — G62.89 OTHER POLYNEUROPATHY: ICD-10-CM

## 2024-09-16 DIAGNOSIS — M79.10 MYALGIA: Primary | ICD-10-CM

## 2024-09-16 DIAGNOSIS — G62.0 DRUG-INDUCED POLYNEUROPATHY (MULTI): ICD-10-CM

## 2024-09-16 DIAGNOSIS — C50.919 MALIGNANT NEOPLASM OF BREAST IN FEMALE, ESTROGEN RECEPTOR POSITIVE, UNSPECIFIED LATERALITY, UNSPECIFIED SITE OF BREAST: ICD-10-CM

## 2024-09-16 DIAGNOSIS — Z17.0 MALIGNANT NEOPLASM OF BREAST IN FEMALE, ESTROGEN RECEPTOR POSITIVE, UNSPECIFIED LATERALITY, UNSPECIFIED SITE OF BREAST: ICD-10-CM

## 2024-09-16 PROCEDURE — 99214 OFFICE O/P EST MOD 30 MIN: CPT | Performed by: HOSPITALIST

## 2024-09-16 NOTE — PROGRESS NOTES
Patient ID: Gracy Espino is a 73 y.o. female.  Referring Physician: No referring provider defined for this encounter.  Primary Care Provider: Steven Garner MD     CANCER HISTORY:   74 yo woman with MBC - ER+, HER 2 neg to LN's, pleura, bones  NGS: [id number]     History:  2007 dx - R breast - T1c - TAC x 6 f/b radiation and AI x 7 yrs     1/23 - SOB/lymphedema with malignant R pleural eff  Metastatic disease     -1/2023: Letrozole initiated.  -2/7/23: Palbociclib initiated.  -5/11/23: Zoledronic acid initiated (q12 weeks).     Lives in [place] for winter     INTEGRATIVE HISTORY:  Symptoms:  Neuropathy - on lyrica, f/b supportive oncology               On duloxetine              Started with chemotherapy (feet)              Then developed numbness to fingers              R shoulder replacement affected as well and had symptoms after that (2022)  Diff raising R shoulder without pain - did PT  R knee/THR  Massage - helps a lot  Acupuncture was less helpful  Has some arthritic symptoms in hands     R abd/LBP pain after standing or walking for 10 min              Imaging stable in bones     Diet: tries to eat healthy     PA: ADL's mostly, sometimes walking dogs    Sleep: doing well, using tramadol     Stress: doing well     Natural Products:    D3, Omega 3, asa     ROS:  no ha, visual symptoms, hearing loss  no sob, chest pain, palp  ROS o/w non contributory, please see HPI        Objective  BSA: There is no height or weight on file to calculate BSA.  There were no vitals taken for this visit.     PHYSICAL EXAM:  NAD, awake/alert  HEENT, NCAT, OP clear, no oral lesions  CTA bilat  RRR no mgr  Abd soft/nt/nd+bs  No c/c/e/ttp  Motor/sensory intact, CN 2-12 intact      RESULTS:            Lab Results     Integrative Labs:     Functional Tests:           Assessment/Plan  Cancer Staging   Carcinoma of breast metastatic to bone (Multi)  Staging form: Bone - Pelvis, AJCC 8th Edition  - Clinical: cM1b - Unsigned     Carcinoma of  left breast metastatic to lung (Multi)  Staging form: Breast, AJCC 8th Edition  - Clinical: Stage IV (pM1) - Unsigned     74 yo woman with MBC - ER+, HER 2 neg to LN's, pleura, bones  NGS: PIK3CA     History:  2007 dx - R breast - T1c - TAC x 6 f/b radiation and AI x 7 yrs     1/23 - SOB/lymphedema with malignant R pleural eff  Metastatic disease     -1/2023: Letrozole initiated.  -2/7/23: Palbociclib initiated.  -5/11/23: Zoledronic acid initiated (q12 weeks).     CANCER SPECIFIC RECCS:     Breast cancer:  Whole Foods high fiber plant based diet   5-9 fruits/veg/day, Cruciferous Vegetables - Brussel Sprouts, Kale, Broccoli, Cauliflower (7 vegetables to 2 fruit)  Organic dairy preferred  Limit sugar              Limit alcohol   Moderate soy is ok (edamame/tofu) once/day  Fiber including flax/zarina seeds beneficial  Drinking Ensure - Orgain would be preferred     Exercise 30 min/day     Supplements to consider:  [employer] STAMETS 7  Consider role of Melatonin 1-10 mg at night 1 hr prior to sleep - she woke up early because of it and does not want to take which I agree  Vitamin D3 1940-1010 IU/day  Omega 3 supplementation (nordic naturals)  Considering CBD - joe URIAS     Reading:  [employer] Living  Cancer Fighting Kitchen     Websites:  [email or web address]  Cook for your Life     Podcast: Integrative Oncology Talk     Apps: Oncio dania ([email or web address])     Support: [employer] (exercise programs, dietitian, support groups, financial support and services)  [employer] HER - passport for integrative services including vegetables/virtual wellness     Symptom Management:  Neuropathy:  Integrative Modalities to consider:  Acupuncture weekly in [facility] weekly x 6 weeks then reassess - did not help  Massage/Reflexology - helped a lot  [facility] referral - not interested right now     Natural Products/Meds to consider:  Cymbalta, Lyrica - using     Back pain - massage - can help  with lymphedema  Acupuncture - not helping     Follow Up: 12 months  IO symptom management acu and massage

## 2024-09-18 ENCOUNTER — APPOINTMENT (OUTPATIENT)
Dept: INTEGRATIVE MEDICINE | Facility: CLINIC | Age: 74
End: 2024-09-18
Payer: MEDICARE

## 2024-09-18 DIAGNOSIS — C50.919 MALIGNANT NEOPLASM OF BREAST IN FEMALE, ESTROGEN RECEPTOR POSITIVE, UNSPECIFIED LATERALITY, UNSPECIFIED SITE OF BREAST: ICD-10-CM

## 2024-09-18 DIAGNOSIS — M79.10 MYALGIA: ICD-10-CM

## 2024-09-18 DIAGNOSIS — M25.611 DECREASED ROM OF RIGHT SHOULDER: ICD-10-CM

## 2024-09-18 DIAGNOSIS — G62.0 DRUG-INDUCED POLYNEUROPATHY (MULTI): Primary | ICD-10-CM

## 2024-09-18 DIAGNOSIS — Z17.0 MALIGNANT NEOPLASM OF BREAST IN FEMALE, ESTROGEN RECEPTOR POSITIVE, UNSPECIFIED LATERALITY, UNSPECIFIED SITE OF BREAST: ICD-10-CM

## 2024-09-18 DIAGNOSIS — G62.89 OTHER POLYNEUROPATHY: ICD-10-CM

## 2024-09-18 PROCEDURE — 97140 MANUAL THERAPY 1/> REGIONS: CPT | Performed by: HOSPITALIST

## 2024-09-18 ASSESSMENT — PAIN SCALES - GENERAL: PAINLEVEL_OUTOF10: 4

## 2024-09-18 NOTE — PROGRESS NOTES
Massage Therapy Visit:     Gracy Espino was referred by Dr. Cleveland.    Condition of Client Subjective :  Patient ID: Gracy Espino is a 73 y.o. female who presents for a 40 minute Gabino Therapy.  Patient is being treated for right breast cancer.  I worked on her supine. Patient stated that she has swelling in her right arm.  I did Gabino Lymph Drainage (MLD).  Patient stated that she has a compression sleeve for that arm.  I work on trying to improve the ROM in that arm.  Patient did notice an improvement with the swelling in that arm.  She did notice an improvement with the ROM.  She was able to relax.         Session Information  Visit Type: Follow-up visit  Description of present complaint: Discomfort, Muscle tension        Objective   Pre-treatment Assessment  Arrival Mode: Ambulatory  Pain Score: 5 - Moderate pain  Anxiety Level (0-10): 0  Stress Level (0-10): 0  Coping Level (0-10): 10  Depression Level (0-10): 0  Fatigue Level (0-10): 1  Nausea Level (0-10): 0  Wellbeing Level (0-10): 1      Actions Assessment/Plan :  Provider reviewed plan for the massage session, precautions and contraindications. Patient/guardian/hospital staff has given consent to treat with full understanding of what to expect during the session. Before massage therapy began, provider explained to the patient to communicate at any time if the pressure was causing discomfort past their tolerance level. Patient agreed to advise therapist.    Massage Treatment  Patient Position: Table  Positioning Assistance: Pillow(s)/bolster under knees while supine  Massage Technique: Relaxation massage, Lymphatic drainiage  Pressure Scale: 1 - Light pressure, 2 - Mild pressure, 3 - Medium pressure    Response:  Post-treatment Assessment  Patient Noted Improvement of the Following Symptoms: Muscle tension, ROM  0-10 (Numeric) Pain Score: 4  Anxiety Level (0-10): 0  Stress Level (0-10): 0  Coping Level (0-10): 10  Depression Level (0-10): 0  Fatigue  Level (0-10): 1  Nausea Level (0-10): 0  Wellbeing Level (0-10): 1    Evaluation:

## 2024-10-15 ENCOUNTER — TELEPHONE (OUTPATIENT)
Dept: PALLIATIVE MEDICINE | Facility: CLINIC | Age: 74
End: 2024-10-15
Payer: MEDICARE

## 2024-10-15 DIAGNOSIS — G89.3 CANCER RELATED PAIN: Primary | ICD-10-CM

## 2024-10-15 DIAGNOSIS — I10 HYPERTENSION, UNSPECIFIED TYPE: Primary | ICD-10-CM

## 2024-10-15 RX ORDER — DULOXETIN HYDROCHLORIDE 60 MG/1
60 CAPSULE, DELAYED RELEASE ORAL NIGHTLY
Qty: 90 CAPSULE | Refills: 0 | Status: SHIPPED | OUTPATIENT
Start: 2024-10-15 | End: 2025-01-13

## 2024-10-15 RX ORDER — SPIRONOLACTONE AND HYDROCHLOROTHIAZIDE 25; 25 MG/1; MG/1
1 TABLET ORAL DAILY
Qty: 90 TABLET | Refills: 3 | Status: SHIPPED | OUTPATIENT
Start: 2024-10-15

## 2024-10-15 RX ORDER — DULOXETIN HYDROCHLORIDE 30 MG/1
30 CAPSULE, DELAYED RELEASE ORAL NIGHTLY
Qty: 90 CAPSULE | Refills: 0 | Status: SHIPPED | OUTPATIENT
Start: 2024-10-15 | End: 2025-01-13

## 2024-10-15 NOTE — TELEPHONE ENCOUNTER
Per last visit with Diana Farias CNP on 9/6/24 patient to continue Duloxetine 90mg (60+30) at bedtime. Patient with follow up visit scheduled with Diana Farias CNP on 11/15/24. Patient updated that medication will be sent to [facility]. Refill request routed to provider.

## 2024-10-15 NOTE — TELEPHONE ENCOUNTER
I called [facility] and they are unable to process script with provider's NPI. They are able to change provider in system to Dr. Michelle Arthur and will fill script today. Patient updated.

## 2024-10-30 ENCOUNTER — APPOINTMENT (OUTPATIENT)
Dept: HEMATOLOGY/ONCOLOGY | Facility: CLINIC | Age: 74
End: 2024-10-30
Payer: MEDICARE

## 2024-10-31 ENCOUNTER — LAB (OUTPATIENT)
Dept: LAB | Facility: LAB | Age: 74
End: 2024-10-31
Payer: MEDICARE

## 2024-10-31 DIAGNOSIS — C50.912 CARCINOMA OF LEFT BREAST METASTATIC TO LUNG (MULTI): ICD-10-CM

## 2024-10-31 DIAGNOSIS — Z17.0 MALIGNANT NEOPLASM OF BREAST IN FEMALE, ESTROGEN RECEPTOR POSITIVE, UNSPECIFIED LATERALITY, UNSPECIFIED SITE OF BREAST: ICD-10-CM

## 2024-10-31 DIAGNOSIS — C78.02 CARCINOMA OF LEFT BREAST METASTATIC TO LUNG (MULTI): ICD-10-CM

## 2024-10-31 DIAGNOSIS — C50.919 MALIGNANT NEOPLASM OF BREAST IN FEMALE, ESTROGEN RECEPTOR POSITIVE, UNSPECIFIED LATERALITY, UNSPECIFIED SITE OF BREAST: ICD-10-CM

## 2024-10-31 LAB
BASOPHILS # BLD MANUAL: 0 X10*3/UL (ref 0–0.1)
BASOPHILS NFR BLD MANUAL: 0 %
EOSINOPHIL # BLD MANUAL: 0.03 X10*3/UL (ref 0–0.4)
EOSINOPHIL NFR BLD MANUAL: 0.9 %
ERYTHROCYTE [DISTWIDTH] IN BLOOD BY AUTOMATED COUNT: 13.8 % (ref 11.5–14.5)
HCT VFR BLD AUTO: 38.3 % (ref 36–46)
HGB BLD-MCNC: 12.8 G/DL (ref 12–16)
IMM GRANULOCYTES # BLD AUTO: 0.02 X10*3/UL (ref 0–0.5)
IMM GRANULOCYTES NFR BLD AUTO: 0.6 % (ref 0–0.9)
LYMPHOCYTES # BLD MANUAL: 0.79 X10*3/UL (ref 0.8–3)
LYMPHOCYTES NFR BLD MANUAL: 25.4 %
MCH RBC QN AUTO: 36.7 PG (ref 26–34)
MCHC RBC AUTO-ENTMCNC: 33.4 G/DL (ref 32–36)
MCV RBC AUTO: 110 FL (ref 80–100)
MONOCYTES # BLD MANUAL: 0.18 X10*3/UL (ref 0.05–0.8)
MONOCYTES NFR BLD MANUAL: 5.9 %
NEUTS SEG # BLD MANUAL: 2.05 X10*3/UL (ref 1.6–5)
NEUTS SEG NFR BLD MANUAL: 66.1 %
NRBC BLD-RTO: 0 /100 WBCS (ref 0–0)
PLATELET # BLD AUTO: 267 X10*3/UL (ref 150–450)
PLATELET CLUMP BLD QL SMEAR: PRESENT
RBC # BLD AUTO: 3.49 X10*6/UL (ref 4–5.2)
RBC MORPH BLD: ABNORMAL
TOTAL CELLS COUNTED BLD: 118
VARIANT LYMPHS # BLD MANUAL: 0.05 X10*3/UL (ref 0–0.3)
VARIANT LYMPHS NFR BLD: 1.7 %
WBC # BLD AUTO: 3.1 X10*3/UL (ref 4.4–11.3)

## 2024-10-31 PROCEDURE — 84100 ASSAY OF PHOSPHORUS: CPT

## 2024-10-31 PROCEDURE — 85027 COMPLETE CBC AUTOMATED: CPT

## 2024-10-31 PROCEDURE — 86300 IMMUNOASSAY TUMOR CA 15-3: CPT

## 2024-10-31 PROCEDURE — 80053 COMPREHEN METABOLIC PANEL: CPT

## 2024-10-31 PROCEDURE — 85007 BL SMEAR W/DIFF WBC COUNT: CPT

## 2024-10-31 PROCEDURE — 36415 COLL VENOUS BLD VENIPUNCTURE: CPT

## 2024-10-31 PROCEDURE — 83735 ASSAY OF MAGNESIUM: CPT

## 2024-11-01 DIAGNOSIS — C50.919 MALIGNANT NEOPLASM OF BREAST IN FEMALE, ESTROGEN RECEPTOR POSITIVE, UNSPECIFIED LATERALITY, UNSPECIFIED SITE OF BREAST: ICD-10-CM

## 2024-11-01 DIAGNOSIS — C50.912 CARCINOMA OF LEFT BREAST METASTATIC TO LUNG (MULTI): Primary | ICD-10-CM

## 2024-11-01 DIAGNOSIS — C78.02 CARCINOMA OF LEFT BREAST METASTATIC TO LUNG (MULTI): Primary | ICD-10-CM

## 2024-11-01 DIAGNOSIS — Z17.0 MALIGNANT NEOPLASM OF BREAST IN FEMALE, ESTROGEN RECEPTOR POSITIVE, UNSPECIFIED LATERALITY, UNSPECIFIED SITE OF BREAST: ICD-10-CM

## 2024-11-01 LAB
ALBUMIN SERPL BCP-MCNC: 4.1 G/DL (ref 3.4–5)
ALP SERPL-CCNC: 72 U/L (ref 33–136)
ALT SERPL W P-5'-P-CCNC: 13 U/L (ref 7–45)
ANION GAP SERPL CALC-SCNC: 13 MMOL/L (ref 10–20)
AST SERPL W P-5'-P-CCNC: 17 U/L (ref 9–39)
BILIRUB SERPL-MCNC: 0.6 MG/DL (ref 0–1.2)
BUN SERPL-MCNC: 22 MG/DL (ref 6–23)
CALCIUM SERPL-MCNC: 9 MG/DL (ref 8.6–10.6)
CANCER AG27-29 SERPL-ACNC: 33 U/ML (ref 0–38.6)
CHLORIDE SERPL-SCNC: 100 MMOL/L (ref 98–107)
CO2 SERPL-SCNC: 28 MMOL/L (ref 21–32)
CREAT SERPL-MCNC: 0.92 MG/DL (ref 0.5–1.05)
EGFRCR SERPLBLD CKD-EPI 2021: 65 ML/MIN/1.73M*2
GLUCOSE SERPL-MCNC: 106 MG/DL (ref 74–99)
MAGNESIUM SERPL-MCNC: 1.99 MG/DL (ref 1.6–2.4)
PHOSPHATE SERPL-MCNC: 3.6 MG/DL (ref 2.5–4.9)
POTASSIUM SERPL-SCNC: 4.4 MMOL/L (ref 3.5–5.3)
PROT SERPL-MCNC: 7.2 G/DL (ref 6.4–8.2)
SODIUM SERPL-SCNC: 137 MMOL/L (ref 136–145)

## 2024-11-05 ENCOUNTER — APPOINTMENT (OUTPATIENT)
Dept: RADIOLOGY | Facility: CLINIC | Age: 74
End: 2024-11-05
Payer: MEDICARE

## 2024-11-05 ENCOUNTER — HOSPITAL ENCOUNTER (OUTPATIENT)
Dept: RADIOLOGY | Facility: HOSPITAL | Age: 74
Discharge: HOME | End: 2024-11-05
Payer: MEDICARE

## 2024-11-05 DIAGNOSIS — C78.02 CARCINOMA OF LEFT BREAST METASTATIC TO LUNG (MULTI): ICD-10-CM

## 2024-11-05 DIAGNOSIS — C50.912 CARCINOMA OF LEFT BREAST METASTATIC TO LUNG (MULTI): ICD-10-CM

## 2024-11-05 PROCEDURE — 74177 CT ABD & PELVIS W/CONTRAST: CPT

## 2024-11-05 PROCEDURE — 71260 CT THORAX DX C+: CPT | Performed by: RADIOLOGY

## 2024-11-05 PROCEDURE — 2550000001 HC RX 255 CONTRASTS: Performed by: INTERNAL MEDICINE

## 2024-11-05 PROCEDURE — 74177 CT ABD & PELVIS W/CONTRAST: CPT | Performed by: RADIOLOGY

## 2024-11-06 ENCOUNTER — ONCOLOGY MEDICATION OUTREACH (OUTPATIENT)
Dept: HEMATOLOGY/ONCOLOGY | Facility: CLINIC | Age: 74
End: 2024-11-06

## 2024-11-06 ENCOUNTER — INFUSION (OUTPATIENT)
Dept: HEMATOLOGY/ONCOLOGY | Facility: CLINIC | Age: 74
End: 2024-11-06
Payer: MEDICARE

## 2024-11-06 ENCOUNTER — OFFICE VISIT (OUTPATIENT)
Dept: HEMATOLOGY/ONCOLOGY | Facility: CLINIC | Age: 74
End: 2024-11-06
Payer: MEDICARE

## 2024-11-06 VITALS
TEMPERATURE: 97.2 F | WEIGHT: 178.46 LBS | DIASTOLIC BLOOD PRESSURE: 75 MMHG | SYSTOLIC BLOOD PRESSURE: 132 MMHG | HEART RATE: 68 BPM | BODY MASS INDEX: 25.93 KG/M2

## 2024-11-06 VITALS — BODY MASS INDEX: 27.59 KG/M2 | HEIGHT: 67 IN

## 2024-11-06 DIAGNOSIS — Z17.0 MALIGNANT NEOPLASM OF BREAST IN FEMALE, ESTROGEN RECEPTOR POSITIVE, UNSPECIFIED LATERALITY, UNSPECIFIED SITE OF BREAST: ICD-10-CM

## 2024-11-06 DIAGNOSIS — C78.02 CARCINOMA OF LEFT BREAST METASTATIC TO LUNG (MULTI): ICD-10-CM

## 2024-11-06 DIAGNOSIS — C50.912 CARCINOMA OF LEFT BREAST METASTATIC TO LUNG (MULTI): ICD-10-CM

## 2024-11-06 DIAGNOSIS — C79.51 CARCINOMA OF RIGHT BREAST METASTATIC TO BONE (MULTI): ICD-10-CM

## 2024-11-06 DIAGNOSIS — C50.919 MALIGNANT NEOPLASM OF BREAST IN FEMALE, ESTROGEN RECEPTOR POSITIVE, UNSPECIFIED LATERALITY, UNSPECIFIED SITE OF BREAST: ICD-10-CM

## 2024-11-06 DIAGNOSIS — C50.911 CARCINOMA OF RIGHT BREAST METASTATIC TO BONE (MULTI): ICD-10-CM

## 2024-11-06 PROCEDURE — 1126F AMNT PAIN NOTED NONE PRSNT: CPT | Performed by: INTERNAL MEDICINE

## 2024-11-06 PROCEDURE — 96365 THER/PROPH/DIAG IV INF INIT: CPT | Mod: INF

## 2024-11-06 PROCEDURE — 2500000004 HC RX 250 GENERAL PHARMACY W/ HCPCS (ALT 636 FOR OP/ED): Performed by: INTERNAL MEDICINE

## 2024-11-06 PROCEDURE — 1160F RVW MEDS BY RX/DR IN RCRD: CPT | Performed by: INTERNAL MEDICINE

## 2024-11-06 PROCEDURE — 99214 OFFICE O/P EST MOD 30 MIN: CPT | Performed by: INTERNAL MEDICINE

## 2024-11-06 PROCEDURE — 1157F ADVNC CARE PLAN IN RCRD: CPT | Performed by: INTERNAL MEDICINE

## 2024-11-06 PROCEDURE — 1159F MED LIST DOCD IN RCRD: CPT | Performed by: INTERNAL MEDICINE

## 2024-11-06 PROCEDURE — 99417 PROLNG OP E/M EACH 15 MIN: CPT | Performed by: INTERNAL MEDICINE

## 2024-11-06 RX ORDER — HEPARIN SODIUM,PORCINE/PF 10 UNIT/ML
50 SYRINGE (ML) INTRAVENOUS AS NEEDED
OUTPATIENT
Start: 2024-11-06

## 2024-11-06 RX ORDER — FAMOTIDINE 10 MG/ML
20 INJECTION INTRAVENOUS ONCE AS NEEDED
OUTPATIENT
Start: 2025-01-16

## 2024-11-06 RX ORDER — ZOLEDRONIC ACID 0.04 MG/ML
4 INJECTION, SOLUTION INTRAVENOUS ONCE
Status: COMPLETED | OUTPATIENT
Start: 2024-11-06 | End: 2024-11-06

## 2024-11-06 RX ORDER — ALBUTEROL SULFATE 0.83 MG/ML
3 SOLUTION RESPIRATORY (INHALATION) AS NEEDED
Status: DISCONTINUED | OUTPATIENT
Start: 2024-11-06 | End: 2024-11-06 | Stop reason: HOSPADM

## 2024-11-06 RX ORDER — HEPARIN 100 UNIT/ML
500 SYRINGE INTRAVENOUS AS NEEDED
OUTPATIENT
Start: 2024-11-06

## 2024-11-06 RX ORDER — EPINEPHRINE 0.3 MG/.3ML
0.3 INJECTION SUBCUTANEOUS EVERY 5 MIN PRN
Status: DISCONTINUED | OUTPATIENT
Start: 2024-11-06 | End: 2024-11-06 | Stop reason: HOSPADM

## 2024-11-06 RX ORDER — DIPHENHYDRAMINE HYDROCHLORIDE 50 MG/ML
50 INJECTION INTRAMUSCULAR; INTRAVENOUS AS NEEDED
OUTPATIENT
Start: 2025-01-16

## 2024-11-06 RX ORDER — ZOLEDRONIC ACID 0.04 MG/ML
4 INJECTION, SOLUTION INTRAVENOUS ONCE
OUTPATIENT
Start: 2025-01-16

## 2024-11-06 RX ORDER — ALBUTEROL SULFATE 0.83 MG/ML
3 SOLUTION RESPIRATORY (INHALATION) AS NEEDED
OUTPATIENT
Start: 2025-01-16

## 2024-11-06 RX ORDER — HEPARIN 100 UNIT/ML
500 SYRINGE INTRAVENOUS AS NEEDED
Status: DISCONTINUED | OUTPATIENT
Start: 2024-11-06 | End: 2024-11-06 | Stop reason: HOSPADM

## 2024-11-06 RX ORDER — EPINEPHRINE 0.3 MG/.3ML
0.3 INJECTION SUBCUTANEOUS EVERY 5 MIN PRN
OUTPATIENT
Start: 2025-01-16

## 2024-11-06 RX ORDER — FAMOTIDINE 10 MG/ML
20 INJECTION INTRAVENOUS ONCE AS NEEDED
Status: DISCONTINUED | OUTPATIENT
Start: 2024-11-06 | End: 2024-11-06 | Stop reason: HOSPADM

## 2024-11-06 RX ORDER — HEPARIN SODIUM,PORCINE/PF 10 UNIT/ML
50 SYRINGE (ML) INTRAVENOUS AS NEEDED
Status: DISCONTINUED | OUTPATIENT
Start: 2024-11-06 | End: 2024-11-06 | Stop reason: HOSPADM

## 2024-11-06 RX ORDER — DIPHENHYDRAMINE HYDROCHLORIDE 50 MG/ML
50 INJECTION INTRAMUSCULAR; INTRAVENOUS AS NEEDED
Status: DISCONTINUED | OUTPATIENT
Start: 2024-11-06 | End: 2024-11-06 | Stop reason: HOSPADM

## 2024-11-06 ASSESSMENT — PAIN SCALES - GENERAL: PAINLEVEL_OUTOF10: 0-NO PAIN

## 2024-11-06 NOTE — PATIENT INSTRUCTIONS
Please call us at [phone] option 5 then option 2 with any questions or concerns     3 month follow-up with dimitry and bloodwork  Continue present medications CT scan looks stable to me. Will call you if radiologist thinks  otherwise  CT scan in about 5 months

## 2024-11-08 ASSESSMENT — ENCOUNTER SYMPTOMS
EYES NEGATIVE: 1
ARTHRALGIAS: 0
CONSTIPATION: 0
DIAPHORESIS: 0
APPETITE CHANGE: 0
HOT FLASHES: 0
SEIZURES: 0
HEMATURIA: 0
DIZZINESS: 0
BLOOD IN STOOL: 0
COUGH: 0
SHORTNESS OF BREATH: 0
ABDOMINAL DISTENTION: 0
CONFUSION: 0
ABDOMINAL PAIN: 0
CHEST TIGHTNESS: 0
WHEEZING: 0
NAUSEA: 0
SCLERAL ICTERUS: 0
ADENOPATHY: 0
SLEEP DISTURBANCE: 0
FATIGUE: 0
NUMBNESS: 0
FEVER: 0
EXTREMITY WEAKNESS: 0
BACK PAIN: 0
MYALGIAS: 0
PALPITATIONS: 0
HEADACHES: 0
DEPRESSION: 0
NERVOUS/ANXIOUS: 0
CARDIOVASCULAR NEGATIVE: 1
DYSURIA: 0
BRUISES/BLEEDS EASILY: 0
DIFFICULTY URINATING: 0
RESPIRATORY NEGATIVE: 1
HEMOPTYSIS: 0
CHILLS: 0
CONSTITUTIONAL NEGATIVE: 1
LEG SWELLING: 0
FREQUENCY: 0
DIARRHEA: 0
EYE PROBLEMS: 0

## 2024-11-08 NOTE — PROGRESS NOTES
Breast Medical Oncology Clinic  Location: [facility]      BREAST CANCER DIAGNOSIS  Carcinoma of breast metastatic to bone (Multi), Clinical: cM1b    Carcinoma of left breast metastatic to lung (Multi), Clinical: Stage IV (pM1)       ONCOLOGIC HISTORY (from Dr Garber's note 5/16/24):  Diagnostic/Therapeutic History:  Diagnosis: Recurrent/metastatic breast cancer.  ER >95% MI 10% Her2-negative (0 IHC).  Sites of Disease: LN's, pleura, bone.  NGS: PIK3CA     -2007: Right T1c N1a ER/MI+ Her2- breast cancer.  -TAC x6 cycles.  -RT completed 3/2008.  -Completed 5 years of anastrozole 4/2013. Two more years, stopped 6/2015.  -1/7/2023: Presented to ER with dyspnea and right lymphedema. CTA showed large right-sided pleural effusion, small left pleural effusion, nodular opacities in MARITZA and lingual, right axillary soft tissue lesion (3.7 x 4.1 cm) with nodular opacities in  subcutaneous tissues of right chest wall.  -1/13/23: PET/CT showed hypermetabolic disease in multiple supraclavicular, axillary, mediastinal, hilar, periaortic LN’s. Osseous lesions in axial and appendicular skeleton, pleural nodularities in right hemithorax, focus of activity in right  posterior triceps muscle.  -1/13/23: RUE MRI confirmed right masslike axillary LAD with mass effect on the neurovascular bundle without occlusion.  -1/20/23: Right axillary biopsy confirmed metastatic IDC, ER >95% MI 10% Her2-negative (0 by IHC). NGS testing showed PIK3CA  mutation.  -1/2023: Letrozole initiated.  -2/7/23: Palbociclib initiated.  -5/11/23: Zoledronic acid initiated (q12 weeks).    CURRENT THERAPY  Letrozole/palbociclib since 2/2023    HISTORY OF PRESENT ILLNESS    Gracy Espino is a 74 y.o. woman previously cared for by Dr Garber and her 1st visit with me. Overall tolerating treatment well. Recent CT from this week showed stable disease in right axilla and stable disease in the bone.            Review of Systems   Constitutional: Negative.  Negative for  appetite change, chills, diaphoresis, fatigue and fever.   HENT:  Negative.  Negative for hearing loss and lump/mass.    Eyes: Negative.  Negative for eye problems and icterus.   Respiratory: Negative.  Negative for chest tightness, cough, hemoptysis, shortness of breath and wheezing.    Cardiovascular: Negative.  Negative for chest pain, leg swelling and palpitations.   Gastrointestinal:  Negative for abdominal distention, abdominal pain, blood in stool, constipation, diarrhea and nausea.   Endocrine: Negative for hot flashes.   Genitourinary:  Negative for bladder incontinence, difficulty urinating, dyspareunia, dysuria, frequency and hematuria.    Musculoskeletal:  Negative for arthralgias, back pain, gait problem and myalgias.   Neurological:  Negative for dizziness, extremity weakness, gait problem, headaches, numbness and seizures.   Hematological:  Negative for adenopathy. Does not bruise/bleed easily.   Psychiatric/Behavioral:  Negative for confusion, depression and sleep disturbance. The patient is not nervous/anxious.    All other systems reviewed and are negative.        Past Medical History:  has a past medical history of Breast cancer (Multi), Disease of thyroid gland, Hypertension, Lymphedema, and Neuropathy.  Surgical History:   has no past surgical history on file.  Social History:   reports that she has never smoked. She has never used smokeless tobacco. She reports current alcohol use of about 2.0 standard drinks of alcohol per week. She reports that she does not use drugs.  Family History:  No family history on file.  Family Oncology History:  Cancer-related family history is not on file.      OBJECTIVE    VS / Pain:  /75 (BP Location: Left arm, Patient Position: Sitting, BP Cuff Size: Adult)   Pulse 68   Temp 36.2 °C (97.2 °F)   Wt 81 kg (178 lb 7.4 oz)   BMI 25.93 kg/m²   BSA: 1.99 meters squared   Pain Scale: 2    Performance Status:  The ECOG performance scale today is ECO-  Restricted in physically strenuous activity.  Carries out light duty.    Physical Exam  Constitutional:       General: She is not in acute distress.     Appearance: She is not ill-appearing, toxic-appearing or diaphoretic.   HENT:      Nose: No congestion or rhinorrhea.      Mouth/Throat:      Pharynx: No posterior oropharyngeal erythema.   Cardiovascular:      Rate and Rhythm: Normal rate and regular rhythm.      Pulses: Normal pulses.      Heart sounds: Normal heart sounds. No murmur heard.     No gallop.   Pulmonary:      Breath sounds: Normal breath sounds.   Abdominal:      General: There is no distension.      Palpations: There is no mass.      Tenderness: There is no abdominal tenderness.   Musculoskeletal:         General: No swelling.      Cervical back: No rigidity.      Right lower leg: No edema.      Left lower leg: No edema.   Lymphadenopathy:      Cervical: No cervical adenopathy.   Skin:     General: Skin is warm.      Coloration: Skin is not cyanotic.      Findings: No bruising, ecchymosis or erythema.   Neurological:      General: No focal deficit present.      Mental Status: She is oriented to person, place, and time.      Cranial Nerves: Cranial nerves 2-12 are intact.      Motor: Motor function is intact.   Psychiatric:         Attention and Perception: Attention and perception normal.         Mood and Affect: Mood and affect normal.         Behavior: Behavior normal.         Thought Content: Thought content normal.         Judgment: Judgment normal.           Diagnostic Results   === 11/05/24 ===    CT CHEST ABDOMEN PELVIS W IV CONTRAST    - Impression -  Breast cancer restaging scan. When compared to the prior examination  dated 08/02/2024, there has been no significant interval change of  the right axillary tumor burden as described above. Stable osseous  metastatic disease. No definite sites of new metastatic disease.  Additional stable chronic and incidental findings as described above.    I  personally reviewed the image(s) / study and I agree with the  findings as stated by Lay Lamar MD. This study was interpreted at  [facility], [place], [place].    MACRO:  None    Signed by: Jayme Dumont 11/6/2024 8:44 PM  Dictation workstation:   [id number]   LABORATORY/PATHOLOGY DATA    Lab on 10/31/2024   Component Date Value Ref Range Status    Magnesium 10/31/2024 1.99  1.60 - 2.40 mg/dL Final    Phosphorus 10/31/2024 3.6  2.5 - 4.9 mg/dL Final    CA 27.29 10/31/2024 33.0  0.0 - 38.6 U/mL Final    WBC 10/31/2024 3.1 (L)  4.4 - 11.3 x10*3/uL Final    nRBC 10/31/2024 0.0  0.0 - 0.0 /100 WBCs Final    RBC 10/31/2024 3.49 (L)  4.00 - 5.20 x10*6/uL Final    Hemoglobin 10/31/2024 12.8  12.0 - 16.0 g/dL Final    Hematocrit 10/31/2024 38.3  36.0 - 46.0 % Final    MCV 10/31/2024 110 (H)  80 - 100 fL Final    MCH 10/31/2024 36.7 (H)  26.0 - 34.0 pg Final    MCHC 10/31/2024 33.4  32.0 - 36.0 g/dL Final    RDW 10/31/2024 13.8  11.5 - 14.5 % Final    Platelets 10/31/2024 267  150 - 450 x10*3/uL Final    Immature Granulocytes %, Automated 10/31/2024 0.6  0.0 - 0.9 % Final    Immature Granulocytes Absolute, Au* 10/31/2024 0.02  0.00 - 0.50 x10*3/uL Final    Glucose 10/31/2024 106 (H)  74 - 99 mg/dL Final    Sodium 10/31/2024 137  136 - 145 mmol/L Final    Potassium 10/31/2024 4.4  3.5 - 5.3 mmol/L Final    Chloride 10/31/2024 100  98 - 107 mmol/L Final    Bicarbonate 10/31/2024 28  21 - 32 mmol/L Final    Anion Gap 10/31/2024 13  10 - 20 mmol/L Final    Urea Nitrogen 10/31/2024 22  6 - 23 mg/dL Final    Creatinine 10/31/2024 0.92  0.50 - 1.05 mg/dL Final    eGFR 10/31/2024 65  >60 mL/min/1.73m*2 Final    Calcium 10/31/2024 9.0  8.6 - 10.6 mg/dL Final    Albumin 10/31/2024 4.1  3.4 - 5.0 g/dL Final    Alkaline Phosphatase 10/31/2024 72  33 - 136 U/L Final    Total Protein 10/31/2024 7.2  6.4 - 8.2 g/dL Final    AST 10/31/2024 17  9 - 39 U/L Final    Bilirubin, Total 10/31/2024 0.6  0.0 - 1.2 mg/dL Final     ALT 10/31/2024 13  7 - 45 U/L Final    Neutrophils %, Manual 10/31/2024 66.1  40.0 - 80.0 % Final    Lymphocytes %, Manual 10/31/2024 25.4  13.0 - 44.0 % Final    Monocytes %, Manual 10/31/2024 5.9  2.0 - 10.0 % Final    Eosinophils %, Manual 10/31/2024 0.9  0.0 - 6.0 % Final    Basophils %, Manual 10/31/2024 0.0  0.0 - 2.0 % Final    Atypical Lymphocytes %, Manual 10/31/2024 1.7  0.0 - 2.0 % Final    Seg Neutrophils Absolute, Manual 10/31/2024 2.05  1.60 - 5.00 x10*3/uL Final    Lymphocytes Absolute, Manual 10/31/2024 0.79 (L)  0.80 - 3.00 x10*3/uL Final    Monocytes Absolute, Manual 10/31/2024 0.18  0.05 - 0.80 x10*3/uL Final    Eosinophils Absolute, Manual 10/31/2024 0.03  0.00 - 0.40 x10*3/uL Final    Basophils Absolute, Manual 10/31/2024 0.00  0.00 - 0.10 x10*3/uL Final    Atypical Lymphs Absolute, Manual 10/31/2024 0.05  0.00 - 0.30 x10*3/uL Final    Total Cells Counted 10/31/2024 118   Final    RBC Morphology 10/31/2024 No significant RBC morphology present   Final    Clumped Platelets 10/31/2024 Present   Final      Lab Results   Component Value Date    LABCA2 33.0 10/31/2024    LABCA2 24.8 02/05/2024    LABCA2 34.0 11/10/2023             IMPRESSION/PLAN    Metastatic ER+/HER2- breast cancer. Continued response to therapy. Will continue present treatment. RTC in about 3 months for follow-up with Gabrielle and bloodwork. Continue zometa q3 months. CT scans have been ordered for about 5 months form now.    2. CKD. Monitor    We discussed the clinical significance of diagnosis, goals of care and treatment plan in detail.     I personally spent over half of a total 35 minutes face to face with the patient in counseling and discussion and/or coordination of care as described above.         -------------------------------------------------------------------------------------------------------------------------------  Jamar Betancourt MD  Director of Breast Cancer Medical Oncology Research Program    [facility]  Professor of Medicine  [facility]  [address], [address]  [place] [id number]  Phone: [phone]  [email or web address]

## 2024-11-11 ENCOUNTER — APPOINTMENT (OUTPATIENT)
Dept: INTEGRATIVE MEDICINE | Facility: CLINIC | Age: 74
End: 2024-11-11
Payer: MEDICARE

## 2024-11-11 DIAGNOSIS — M79.10 MYALGIA: ICD-10-CM

## 2024-11-11 DIAGNOSIS — G89.3 CANCER RELATED PAIN: Primary | ICD-10-CM

## 2024-11-11 DIAGNOSIS — Z17.0 MALIGNANT NEOPLASM OF BREAST IN FEMALE, ESTROGEN RECEPTOR POSITIVE, UNSPECIFIED LATERALITY, UNSPECIFIED SITE OF BREAST: ICD-10-CM

## 2024-11-11 DIAGNOSIS — C50.919 MALIGNANT NEOPLASM OF BREAST IN FEMALE, ESTROGEN RECEPTOR POSITIVE, UNSPECIFIED LATERALITY, UNSPECIFIED SITE OF BREAST: ICD-10-CM

## 2024-11-11 PROCEDURE — 97140 MANUAL THERAPY 1/> REGIONS: CPT | Performed by: HOSPITALIST

## 2024-11-11 ASSESSMENT — PAIN SCALES - GENERAL: PAINLEVEL_OUTOF10: 5 - MODERATE PAIN

## 2024-11-11 NOTE — PROGRESS NOTES
Massage Therapy Visit:     Gracy Espino was referred by Dr. Cleveland.    Condition of Client Subjective :  Patient ID: Gracy Espino is a 74 y.o. female who presents for a 40 minute Gabino Therapy.  Patient is being treated for breast cancer.  Patient wears a compression sleeve on her right arm.  I did Gabino Lymph Drainage on that arm.  I worked on her supine.  She stated that she has neuropathy in her feet and lower leg. I focused on  her neck, arms, legs and feet.  I checked in her with her often.  Patient did notice an improvement with her muscle discomfort.     Session Information  Visit Type: Follow-up visit  Description of present complaint: Muscle tension        Objective   Pre-treatment Assessment  Arrival Mode: Ambulatory  Pain Score: 7  Anxiety Level (0-10): 1  Stress Level (0-10): 0  Coping Level (0-10): 10  Depression Level (0-10): 0  Fatigue Level (0-10): 1  Nausea Level (0-10): 0  Condition of Client Note: 1        Actions Assessment/Plan :  Provider reviewed plan for the massage session, precautions and contraindications. Patient/guardian/hospital staff has given consent to treat with full understanding of what to expect during the session. Before massage therapy began, provider explained to the patient to communicate at any time if the pressure was causing discomfort past their tolerance level. Patient agreed to advise therapist.    Massage Treatment  Patient Position: Table  Positioning Assistance: Pillow(s)/bolster under knees while supine  Massage Technique: Relaxation massage, Lymphatic drainiage  Pressure Scale: 1 - Light pressure, 2 - Mild pressure    Response:  Post-treatment Assessment  Patient Noted Improvement of the Following Symptoms: Muscle tension  0-10 (Numeric) Pain Score: 5 - Moderate pain  Anxiety Level (0-10): 0  Stress Level (0-10): 0  Coping Level (0-10): 9  Depression Level (0-10): 0  Fatigue Level (0-10): 1  Nausea Level (0-10): 0  Wellbeing Level (0-10): 1    Evaluation:    Patient will follow up as needed.

## 2024-11-13 ENCOUNTER — SOCIAL WORK (OUTPATIENT)
Dept: CASE MANAGEMENT | Facility: HOSPITAL | Age: 74
End: 2024-11-13
Payer: MEDICARE

## 2024-11-13 NOTE — PROGRESS NOTES
Social Work Note    Referral Source: medical team  Meeting Location: Phone  Person(s) Present: na  Identified Needs: ibrance renewal  Impression and Plan: ibrance  Interventions Provided:  securing new application for renewal  Estimated Time Spent: 30 minutes.  Kenyon reached out to patient and her spouse regarding yearly renewal of Ibrance. Kenyon contacted [employer] who faxed new application. Portion was sent to the patient to complete and Kenyon emailed physician for his portion to complete. Once both sections are done and returned to  it will be faxed to [email or web address] oncology together program  [phone]. Their phone number is [phone]    SW will continue to follow as needed.

## 2024-11-14 ENCOUNTER — SPECIALTY PHARMACY (OUTPATIENT)
Dept: PHARMACY | Facility: CLINIC | Age: 74
End: 2024-11-14

## 2024-11-14 DIAGNOSIS — C79.51 CARCINOMA OF BREAST METASTATIC TO BONE, UNSPECIFIED LATERALITY (MULTI): Primary | ICD-10-CM

## 2024-11-14 DIAGNOSIS — C50.919 CARCINOMA OF BREAST METASTATIC TO BONE, UNSPECIFIED LATERALITY (MULTI): Primary | ICD-10-CM

## 2024-11-15 ENCOUNTER — OFFICE VISIT (OUTPATIENT)
Dept: PALLIATIVE MEDICINE | Facility: CLINIC | Age: 74
End: 2024-11-15
Payer: MEDICARE

## 2024-11-15 VITALS
SYSTOLIC BLOOD PRESSURE: 112 MMHG | WEIGHT: 177.91 LBS | OXYGEN SATURATION: 98 % | HEART RATE: 68 BPM | BODY MASS INDEX: 27.5 KG/M2 | RESPIRATION RATE: 18 BRPM | DIASTOLIC BLOOD PRESSURE: 58 MMHG | TEMPERATURE: 97 F

## 2024-11-15 DIAGNOSIS — C79.51 CARCINOMA OF BREAST METASTATIC TO BONE, UNSPECIFIED LATERALITY (MULTI): Primary | ICD-10-CM

## 2024-11-15 DIAGNOSIS — Z51.5 PALLIATIVE CARE ENCOUNTER: ICD-10-CM

## 2024-11-15 DIAGNOSIS — G62.0 PERIPHERAL NEUROPATHY DUE TO CHEMOTHERAPY (MULTI): ICD-10-CM

## 2024-11-15 DIAGNOSIS — T45.1X5A PERIPHERAL NEUROPATHY DUE TO CHEMOTHERAPY (MULTI): ICD-10-CM

## 2024-11-15 DIAGNOSIS — C50.919 CARCINOMA OF BREAST METASTATIC TO BONE, UNSPECIFIED LATERALITY (MULTI): Primary | ICD-10-CM

## 2024-11-15 PROCEDURE — 99214 OFFICE O/P EST MOD 30 MIN: CPT

## 2024-11-15 PROCEDURE — 1036F TOBACCO NON-USER: CPT

## 2024-11-15 PROCEDURE — 1126F AMNT PAIN NOTED NONE PRSNT: CPT

## 2024-11-15 PROCEDURE — 1159F MED LIST DOCD IN RCRD: CPT

## 2024-11-15 PROCEDURE — 1157F ADVNC CARE PLAN IN RCRD: CPT

## 2024-11-15 ASSESSMENT — PAIN SCALES - GENERAL: PAINLEVEL_OUTOF10: 0-NO PAIN

## 2024-11-15 NOTE — PROGRESS NOTES
"  SUPPORTIVE AND PALLIATIVE ONCOLOGY OUTPATIENT FOLLOW-UP      SERVICE DATE: 11/15/2024    Subjective   HISTORY OF PRESENT ILLNESS: Gracy Espino is a 74 y.o. female who presents with who presents with  hx. of recurrent metastatic breast cancer (other sites: LN, pleura, bone). She is currently on Letrozole + palbociclib.        Pain Assessment:  Pain Score: 4  Location:  bilateral hands and feet - primarily in left hand  Education:  current pain regimen     Symptom Assessment:  Pain:somewhat- lymphedema, CIPN- related  Headache: none  Dizziness:none  Lack of energy: a little  Difficulty sleeping: none  Worrying: none  Anxiety: none  Depression: none  Shortness of breath: none  Lack of appetite: none   Nausea: none  Vomiting: none  Constipation: none  Diarrhea: none  Numbness or tingling in hands/feet/other: somewhat- stable  Weight loss: none    Information obtained from: chart review and interview of patient  ______________________________________________________________________        Objective   No results found for this or any previous visit (from the past 96 hours).             PHYSICAL EXAMINATION   Vital Signs:   Vital signs reviewed      5/16/2024     9:51 AM 7/12/2024    10:39 AM 8/7/2024    11:17 AM 9/6/2024    10:07 AM 11/6/2024    11:04 AM 11/6/2024    12:42 PM 11/15/2024    10:15 AM   Vitals   Systolic 123 120 123 122 132  112   Diastolic 69 74 75 72 75  58   Heart Rate 67 72 77 69 68  68   Temp 36.4 °C (97.5 °F) 35.8 °C (96.4 °F) 36.7 °C (98.1 °F) 36.2 °C (97.2 °F) 36.2 °C (97.2 °F)  36.1 °C (97 °F)   Resp 20 18  18   18   Height (in) 1.767 m (5' 9.57\")     1.713 m (5' 7.44\")     Weight (lb) 177.25 177.03 182.43 181.77 178.46  177.91   BMI 25.75 kg/m2 25.72 kg/m2 26.5 kg/m2 26.41 kg/m2 25.93 kg/m2 27.59 kg/m2 27.5 kg/m2   BSA (m2) 1.99 m2 1.99 m2 2.02 m2 2.01 m2 1.99 m2 1.96 m2 1.96 m2   Visit Report Report Report Report Report Report Report Report       Significant value          Physical " Exam  Constitutional:       Appearance: She is normal weight.   HENT:      Head: Normocephalic.      Mouth/Throat:      Mouth: Mucous membranes are moist.      Pharynx: Oropharynx is clear.   Eyes:      Conjunctiva/sclera: Conjunctivae normal.      Pupils: Pupils are equal, round, and reactive to light.   Pulmonary:      Effort: Pulmonary effort is normal.   Abdominal:      General: Abdomen is flat.   Musculoskeletal:         General: Swelling present. Normal range of motion.      Cervical back: Normal range of motion.      Comments: Right arm lymphedema      Skin:     General: Skin is warm and dry.   Neurological:      General: No focal deficit present.      Mental Status: She is alert.   Psychiatric:         Mood and Affect: Mood normal.         Behavior: Behavior normal.       ASSESSMENT/PLAN    Pain  Pain is: cancer related pain  Type: neuropathic; CIPN  Pain control: sub-optimally controlled  Home regimen:   Duloxetine 90mg daily with dinner  Tramadol 50mg q8h as needed (takes ~1x/day to good relief)  Pregabalin 50mg TID   Intolerances/previously tried: Gabapentin (felt sleepy)  Educated that it is okay to occasionally pre-medicate activity with Tramadol, especially with ongoing goal to reduce sedentary behavior.  Encouraged to increase activity- exercise can be beneficial for both CIPN and constipation- has access to a water aerobics club/class in [place] and the [employer] in [place], encouraged her to join as this may be tolerable exercise that can feel good on the body; reiterated today  Referral 5/15/24 to Integrative Oncology- started acupuncture (unsure of benefit) and massage (had several sessions)    Overdose Risk Score:030    Constipation  At risk for constipation related to opioids,   intermittent constipation  Encouraged to take OTC stool softener daily and call if this is note helpful.  Encouraged adequate activity & hydration to promote bowel motility.     Sleeping Difficulty:  Impaired sleep related  to CIPN  Home regimen:  see pain notes     Supportive Interventions: n/a- will continue to evaluate needs     Supportive and Palliative Oncology encounter:  Spoke with patient and Michael via virtual platform  Emotional support provided  Coordination of care  We will continue to follow and address symptoms as needed     Medical Decision Making/Goals of Care/Advance Care Planning:  Patient's current clinical condition, including diagnosis, prognosis, and management plan, and goals of care were discussed.   Life limiting disease: metastatic malignancy  Family: Supportive   Performance status: Major limitations due to pain  Goals: symptom control and cancer directed therapy     Advance Directives  Existence of Advance Directives:Yes, documentation or copy in medical record  Decision maker: HCPOA is Michael garcia  Code Status: Full code    Next Follow-Up Visit:  Return to clinic in 2 months prior to pt &  traveling back to [place]    Signature and billing  Medical complexity was moderate level due to due to complexity of problems, extensive data review, and high risk of management/treatment.  Time was spent on the following: Prep Time, Time Directly with Patient/Family/Caregiver, Documentation Time. Total time spent: 35minutes      Data  Diagnostic tests and information reviewed for today's visit:  Most recent labs and imaging results, Medications     9/6/24 changes to plan indicated in bold. Continue all other regimens as listed. Pt's  is preparing for an abdominal aneurysm repair at  in the next month.     Some elements copied from Supportive Oncology note on 7/12/24 , the elements have been updated and all reflect current decision making from today, 11/15/2024.      Plan of Care discussed with: Patient, Family/Significant Other: , and RN    SIGNATURE: DAVE Brito-CNP    Contact information:  Supportive and Palliative Oncology  Monday-Friday 8 AM-5 PM  Phone:   [phone], press option #5, then option #1.   Or Epic Secure Chat       I, DAVE Brito-BISI, attest to the above assessment and plan.

## 2024-11-25 ENCOUNTER — TELEPHONE (OUTPATIENT)
Dept: ADMISSION | Facility: HOSPITAL | Age: 74
End: 2024-11-25

## 2024-11-25 ENCOUNTER — APPOINTMENT (OUTPATIENT)
Dept: INTEGRATIVE MEDICINE | Facility: CLINIC | Age: 74
End: 2024-11-25
Payer: MEDICARE

## 2024-11-25 DIAGNOSIS — G89.3 CANCER RELATED PAIN: ICD-10-CM

## 2024-11-25 RX ORDER — TRAMADOL HYDROCHLORIDE 50 MG/1
50 TABLET ORAL EVERY 8 HOURS PRN
Qty: 90 TABLET | Refills: 0 | Status: SHIPPED | OUTPATIENT
Start: 2024-11-25 | End: 2024-12-25

## 2024-11-25 NOTE — TELEPHONE ENCOUNTER
Patient last seen by DAVE Farias on 11/15 with plan to continue Tramadol 50mg q8h PRN. Follow up visit is scheduled for 1/10. OARRS reviewed by provider as it was not working for this RN. Prescription pended to provider to approve.

## 2024-12-18 ENCOUNTER — TELEPHONE (OUTPATIENT)
Dept: HEMATOLOGY/ONCOLOGY | Facility: HOSPITAL | Age: 74
End: 2024-12-18
Payer: MEDICARE

## 2024-12-18 DIAGNOSIS — C50.912 CARCINOMA OF LEFT BREAST METASTATIC TO LUNG (MULTI): Primary | ICD-10-CM

## 2024-12-18 DIAGNOSIS — C78.02 CARCINOMA OF LEFT BREAST METASTATIC TO LUNG (MULTI): Primary | ICD-10-CM

## 2024-12-18 NOTE — TELEPHONE ENCOUNTER
Call to patient to discuss appointments- in [place] and needs to adjust follow up and Zometa apts. Will move next Zometa 12 days early to 1/17/25 with visit with me at [facility].     Plan for subsequent zometa to be scheduled same day as guido and juan visit on 5/12/25

## 2025-01-02 ENCOUNTER — SPECIALTY PHARMACY (OUTPATIENT)
Dept: PHARMACY | Facility: CLINIC | Age: 75
End: 2025-01-02

## 2025-01-02 PROCEDURE — RXMED WILLOW AMBULATORY MEDICATION CHARGE

## 2025-01-07 ENCOUNTER — PHARMACY VISIT (OUTPATIENT)
Dept: PHARMACY | Facility: CLINIC | Age: 75
End: 2025-01-07
Payer: COMMERCIAL

## 2025-01-10 ENCOUNTER — APPOINTMENT (OUTPATIENT)
Dept: PALLIATIVE MEDICINE | Facility: CLINIC | Age: 75
End: 2025-01-10
Payer: MEDICARE

## 2025-01-10 ENCOUNTER — TELEPHONE (OUTPATIENT)
Dept: ADMISSION | Facility: HOSPITAL | Age: 75
End: 2025-01-10
Payer: MEDICARE

## 2025-01-10 DIAGNOSIS — G89.3 CANCER RELATED PAIN: ICD-10-CM

## 2025-01-10 DIAGNOSIS — Z51.5 PALLIATIVE CARE ENCOUNTER: ICD-10-CM

## 2025-01-10 RX ORDER — DULOXETIN HYDROCHLORIDE 30 MG/1
30 CAPSULE, DELAYED RELEASE ORAL NIGHTLY
Qty: 90 CAPSULE | Refills: 0 | Status: SHIPPED | OUTPATIENT
Start: 2025-01-10 | End: 2025-04-10

## 2025-01-10 RX ORDER — DULOXETIN HYDROCHLORIDE 60 MG/1
60 CAPSULE, DELAYED RELEASE ORAL NIGHTLY
Qty: 90 CAPSULE | Refills: 0 | Status: SHIPPED | OUTPATIENT
Start: 2025-01-10 | End: 2025-04-10

## 2025-01-10 RX ORDER — PREGABALIN 50 MG/1
50 CAPSULE ORAL 3 TIMES DAILY
Qty: 270 CAPSULE | Refills: 0 | Status: SHIPPED | OUTPATIENT
Start: 2025-01-10 | End: 2025-04-10

## 2025-01-10 NOTE — TELEPHONE ENCOUNTER
Refills pended to provider for Duloxetine 30 mg at bedtime, Duloxetine 60 mg at bedtime, and pregabalin 50 mg TID.  All 90 day supplies.  Unable to verify OARRS.  Patient lives in [place].

## 2025-01-10 NOTE — TELEPHONE ENCOUNTER
Gracy Espino called the refill line for Duloxetine 30mg, Duloxetine 60mg and Pregabalin 50mg. Would like refills to be sent to [facility] pharmacy on file. Message sent to Palliative Care team to send in.

## 2025-01-13 ENCOUNTER — LAB (OUTPATIENT)
Dept: LAB | Facility: LAB | Age: 75
End: 2025-01-13
Payer: MEDICARE

## 2025-01-13 DIAGNOSIS — C78.02 CARCINOMA OF LEFT BREAST METASTATIC TO LUNG (MULTI): ICD-10-CM

## 2025-01-13 DIAGNOSIS — C50.912 CARCINOMA OF LEFT BREAST METASTATIC TO LUNG (MULTI): ICD-10-CM

## 2025-01-13 DIAGNOSIS — Z17.0 MALIGNANT NEOPLASM OF BREAST IN FEMALE, ESTROGEN RECEPTOR POSITIVE, UNSPECIFIED LATERALITY, UNSPECIFIED SITE OF BREAST: ICD-10-CM

## 2025-01-13 DIAGNOSIS — C50.919 MALIGNANT NEOPLASM OF BREAST IN FEMALE, ESTROGEN RECEPTOR POSITIVE, UNSPECIFIED LATERALITY, UNSPECIFIED SITE OF BREAST: ICD-10-CM

## 2025-01-13 LAB
ALBUMIN SERPL BCP-MCNC: 3.9 G/DL (ref 3.4–5)
ALP SERPL-CCNC: 72 U/L (ref 33–136)
ALT SERPL W P-5'-P-CCNC: 13 U/L (ref 7–45)
ANION GAP SERPL CALC-SCNC: 10 MMOL/L (ref 10–20)
AST SERPL W P-5'-P-CCNC: 17 U/L (ref 9–39)
BASOPHILS # BLD AUTO: 0.02 X10*3/UL (ref 0–0.1)
BASOPHILS NFR BLD AUTO: 0.7 %
BILIRUB SERPL-MCNC: 0.4 MG/DL (ref 0–1.2)
BUN SERPL-MCNC: 27 MG/DL (ref 6–23)
CALCIUM SERPL-MCNC: 9.1 MG/DL (ref 8.6–10.3)
CANCER AG27-29 SERPL-ACNC: 25.7 U/ML (ref 0–38.6)
CHLORIDE SERPL-SCNC: 103 MMOL/L (ref 98–107)
CO2 SERPL-SCNC: 29 MMOL/L (ref 21–32)
CREAT SERPL-MCNC: 0.82 MG/DL (ref 0.5–1.05)
EGFRCR SERPLBLD CKD-EPI 2021: 75 ML/MIN/1.73M*2
EOSINOPHIL # BLD AUTO: 0.03 X10*3/UL (ref 0–0.4)
EOSINOPHIL NFR BLD AUTO: 1.1 %
ERYTHROCYTE [DISTWIDTH] IN BLOOD BY AUTOMATED COUNT: 14.2 % (ref 11.5–14.5)
GLUCOSE SERPL-MCNC: 127 MG/DL (ref 74–99)
HCT VFR BLD AUTO: 36.7 % (ref 36–46)
HGB BLD-MCNC: 12.4 G/DL (ref 12–16)
IMM GRANULOCYTES # BLD AUTO: 0.02 X10*3/UL (ref 0–0.5)
IMM GRANULOCYTES NFR BLD AUTO: 0.7 % (ref 0–0.9)
LYMPHOCYTES # BLD AUTO: 0.96 X10*3/UL (ref 0.8–3)
LYMPHOCYTES NFR BLD AUTO: 35 %
MAGNESIUM SERPL-MCNC: 1.56 MG/DL (ref 1.6–2.4)
MCH RBC QN AUTO: 36.5 PG (ref 26–34)
MCHC RBC AUTO-ENTMCNC: 33.8 G/DL (ref 32–36)
MCV RBC AUTO: 108 FL (ref 80–100)
MONOCYTES # BLD AUTO: 0.52 X10*3/UL (ref 0.05–0.8)
MONOCYTES NFR BLD AUTO: 19 %
NEUTROPHILS # BLD AUTO: 1.19 X10*3/UL (ref 1.6–5.5)
NEUTROPHILS NFR BLD AUTO: 43.5 %
NRBC BLD-RTO: 0 /100 WBCS (ref 0–0)
PHOSPHATE SERPL-MCNC: 3.7 MG/DL (ref 2.5–4.9)
PLATELET # BLD AUTO: 145 X10*3/UL (ref 150–450)
POTASSIUM SERPL-SCNC: 3.8 MMOL/L (ref 3.5–5.3)
PROT SERPL-MCNC: 7 G/DL (ref 6.4–8.2)
RBC # BLD AUTO: 3.4 X10*6/UL (ref 4–5.2)
SODIUM SERPL-SCNC: 138 MMOL/L (ref 136–145)
WBC # BLD AUTO: 2.7 X10*3/UL (ref 4.4–11.3)

## 2025-01-13 PROCEDURE — 86300 IMMUNOASSAY TUMOR CA 15-3: CPT

## 2025-01-13 PROCEDURE — 85025 COMPLETE CBC W/AUTO DIFF WBC: CPT

## 2025-01-13 PROCEDURE — 84100 ASSAY OF PHOSPHORUS: CPT

## 2025-01-13 PROCEDURE — 80053 COMPREHEN METABOLIC PANEL: CPT

## 2025-01-13 PROCEDURE — 83735 ASSAY OF MAGNESIUM: CPT

## 2025-01-16 PROBLEM — Z51.11 ENCOUNTER FOR ANTINEOPLASTIC CHEMOTHERAPY: Status: ACTIVE | Noted: 2025-01-16

## 2025-01-16 PROBLEM — Z79.811 AROMATASE INHIBITOR USE: Status: ACTIVE | Noted: 2025-01-16

## 2025-01-16 PROBLEM — Z79.83 ENCOUNTER FOR MONITORING BISPHOSPHONATE THERAPY: Status: ACTIVE | Noted: 2025-01-16

## 2025-01-16 PROBLEM — Z51.81 ENCOUNTER FOR MONITORING BISPHOSPHONATE THERAPY: Status: ACTIVE | Noted: 2025-01-16

## 2025-01-16 NOTE — PROGRESS NOTES
Breast Medical Oncology Clinic  Location: [facility]      BREAST CANCER DIAGNOSIS  Carcinoma of breast metastatic to bone (Multi), Clinical: cM1b    Carcinoma of left breast metastatic to lung (Multi), Clinical: Stage IV (pM1)       ONCOLOGIC HISTORY (from Dr Garber's note 5/16/24):  Diagnostic/Therapeutic History:  Diagnosis: Recurrent/metastatic breast cancer.  ER >95% MT 10% Her2-negative (0 IHC).  Sites of Disease: LN's, pleura, bone.  NGS: PIK3CA     -2007: Right T1c N1a ER/MT+ Her2- breast cancer.  -Right mastectomy   -TAC x6 cycles with Dr Dickerson   -RT completed 3/2008.  -Completed 7 years of anastrozole 6/2015.    -1/7/2023: Presented to ER with dyspnea and right lymphedema. CTA showed large right-sided pleural effusion, small left pleural effusion, nodular opacities in MARITZA and lingual, right axillary soft tissue lesion (3.7 x 4.1 cm) with nodular opacities in  subcutaneous tissues of right chest wall.  -1/13/23: PET/CT showed hypermetabolic disease in multiple supraclavicular, axillary, mediastinal, hilar, periaortic LN’s. Osseous lesions in axial and appendicular skeleton, pleural nodularities in right hemithorax, focus of activity in right  posterior triceps muscle.  -1/13/23: RUE MRI confirmed right masslike axillary LAD with mass effect on the neurovascular bundle without occlusion.  -1/20/23: Right axillary biopsy confirmed metastatic IDC, ER >95% MT 10% Her2-negative (0 by IHC). NGS testing showed PIK3CA  mutation.  -1/2023: Letrozole initiated.  -2/7/23: Palbociclib initiated.  -5/11/23: Zoledronic acid initiated (q12 weeks).    CURRENT THERAPY  Letrozole/palbociclib since 2/2023    HISTORY OF PRESENT ILLNESS    Gracy Espino is a 74 y.o. woman who presents today for IV breast cancer follow up. She is newly established with Dr Jamar Betancourt and was previously cared for by Dr Garber. Today I have reviewed with the patient I will be conducting a clinical physical breast exam. Patient has declined a  "second medical professional today during the exam as a chapperone.     She is accompanied today by her  Rashi. She is feeling well on daily letrozole. She is on week one of ibrance. She has no current concerns for progression of her known IV breast cancer.     She denies any chest pain or breathing issues, no cough or sob.     She has baseline issues with neuropathy in right hand with cramping. She sees supportive oncology and manages on medications for neuropathy with Diana Farias CNP. She denies any vision changes however knows she has cataracts. She denies headache issues, dizziness or loss of balance, no falls     She denies any new or unexplained bone aches or pains. Hx of right hip and knee replacement, baseline intermittent aches. She is baseline \"stiff\" in the mornings that works its self out with stretching. She has exercise limitations at baseline due to arthritis and sometimes will use a walker for long distances.     She denies any skin lesions or masses, oral sores lesions or infections    She reports a normal appetite and normal bowel movements, normal urination.    She denies any issues with sleep. She reports stable fatigue      Review of Systems  ROS 14 points performed, See HPI for exceptions        Past Medical History:  has a past medical history of Breast cancer (Multi), Disease of thyroid gland, Hypertension, Lymphedema, and Neuropathy.  Surgical History:   has no past surgical history on file.  Social History:   reports that she has never smoked. She has never used smokeless tobacco. She reports current alcohol use of about 2.0 standard drinks of alcohol per week. She reports that she does not use drugs.    She is  to Rashi x 50 years. She is a homemaker     GYN  adult sons 1 local and 2 out of town     Family History:  No family history on file.  Family Oncology History:  Cancer-related family history is not on file.      OBJECTIVE    VS / Pain:  /65 (BP " Location: Left arm, Patient Position: Sitting)   Pulse 64   Temp 35.5 °C (95.9 °F)   SpO2 94%   BSA: There is no height or weight on file to calculate BSA.   Pain Scale: 2    Performance Status:  The ECOG performance scale today is ECO- Restricted in physically strenuous activity.  Carries out light duty.    Physical Exam  Constitutional: Well developed, awake/alert/oriented x4, no distress, alert and cooperative  EYES: Sclera clear  ENMT: mucous membranes moist, no apparent injury, no lesions seen  Head/Neck: Neck supple, no apparent injury, thyroid without mass or tenderness, No JVD, trachea midline, no bruits  Respiratory / Thoracic: Patent airways, clear to all lobes, normal breath sounds with good chest expansion, thorax symmetric.  Cardiovascular: Regular, rate and rhythm, no murmurs, 2+ equal pulses of the extremities, normal auscultated S 1and S 2  GI: Nondistended, soft, non-tender, no rebound tenderness or guarding, no masses palpable, no organomegaly, +BS, no bruits  Musculoskeletal: ROM intact with exception to right shoulder. No joint swelling, normal strength, no spinal tenderness  Extremities: normal extremities, no cyanosis edema, contusions or wounds, no clubbing  Neurological: alert and oriented x4, intact senses, motor, response and reflexes, normal strength  Breast: hx right mastectomy with reconstruction. No palpable masses or lesions in right chest wall or left breast  Lymphatic: No cervical, supraclavicular, infraclavicular or axillary lymphadenopathy. No current right arm lymphedema, wearing compression sleeve.   Psychological: Appropriate and talkative mood and behavior  Skin: Warm and dry, no lesions, no rashes, no jaundice    Diagnostic Results   === 24 ===    CT CHEST ABDOMEN PELVIS W IV CONTRAST    - Impression -  Breast cancer restaging scan. When compared to the prior examination  dated 2024, there has been no significant interval change of  the right axillary tumor  burden as described above. Stable osseous  metastatic disease. No definite sites of new metastatic disease.  Additional stable chronic and incidental findings as described above.    I personally reviewed the image(s) / study and I agree with the  findings as stated by Lay Lamar MD. This study was interpreted at  [facility], [place], [place].    MACRO:  None    Signed by: Jayme Dumont 11/6/2024 8:44 PM  Dictation workstation:   [id number]   LABORATORY/PATHOLOGY DATA    Lab on 01/13/2025   Component Date Value Ref Range Status    Glucose 01/13/2025 127 (H)  74 - 99 mg/dL Final    Sodium 01/13/2025 138  136 - 145 mmol/L Final    Potassium 01/13/2025 3.8  3.5 - 5.3 mmol/L Final    Chloride 01/13/2025 103  98 - 107 mmol/L Final    Bicarbonate 01/13/2025 29  21 - 32 mmol/L Final    Anion Gap 01/13/2025 10  10 - 20 mmol/L Final    Urea Nitrogen 01/13/2025 27 (H)  6 - 23 mg/dL Final    Creatinine 01/13/2025 0.82  0.50 - 1.05 mg/dL Final    eGFR 01/13/2025 75  >60 mL/min/1.73m*2 Final    Calcium 01/13/2025 9.1  8.6 - 10.3 mg/dL Final    Albumin 01/13/2025 3.9  3.4 - 5.0 g/dL Final    Alkaline Phosphatase 01/13/2025 72  33 - 136 U/L Final    Total Protein 01/13/2025 7.0  6.4 - 8.2 g/dL Final    AST 01/13/2025 17  9 - 39 U/L Final    Bilirubin, Total 01/13/2025 0.4  0.0 - 1.2 mg/dL Final    ALT 01/13/2025 13  7 - 45 U/L Final    Magnesium 01/13/2025 1.56 (L)  1.60 - 2.40 mg/dL Final    Phosphorus 01/13/2025 3.7  2.5 - 4.9 mg/dL Final    CA 27.29 01/13/2025 25.7  0.0 - 38.6 U/mL Final    WBC 01/13/2025 2.7 (L)  4.4 - 11.3 x10*3/uL Final    nRBC 01/13/2025 0.0  0.0 - 0.0 /100 WBCs Final    RBC 01/13/2025 3.40 (L)  4.00 - 5.20 x10*6/uL Final    Hemoglobin 01/13/2025 12.4  12.0 - 16.0 g/dL Final    Hematocrit 01/13/2025 36.7  36.0 - 46.0 % Final    MCV 01/13/2025 108 (H)  80 - 100 fL Final    MCH 01/13/2025 36.5 (H)  26.0 - 34.0 pg Final    MCHC 01/13/2025 33.8  32.0 - 36.0 g/dL Final    RDW 01/13/2025 14.2   11.5 - 14.5 % Final    Platelets 01/13/2025 145 (L)  150 - 450 x10*3/uL Final    Neutrophils % 01/13/2025 43.5  40.0 - 80.0 % Final    Immature Granulocytes %, Automated 01/13/2025 0.7  0.0 - 0.9 % Final    Lymphocytes % 01/13/2025 35.0  13.0 - 44.0 % Final    Monocytes % 01/13/2025 19.0  2.0 - 10.0 % Final    Eosinophils % 01/13/2025 1.1  0.0 - 6.0 % Final    Basophils % 01/13/2025 0.7  0.0 - 2.0 % Final    Neutrophils Absolute 01/13/2025 1.19 (L)  1.60 - 5.50 x10*3/uL Final    Immature Granulocytes Absolute, Au* 01/13/2025 0.02  0.00 - 0.50 x10*3/uL Final    Lymphocytes Absolute 01/13/2025 0.96  0.80 - 3.00 x10*3/uL Final    Monocytes Absolute 01/13/2025 0.52  0.05 - 0.80 x10*3/uL Final    Eosinophils Absolute 01/13/2025 0.03  0.00 - 0.40 x10*3/uL Final    Basophils Absolute 01/13/2025 0.02  0.00 - 0.10 x10*3/uL Final      Lab Results   Component Value Date    LABCA2 25.7 01/13/2025    LABCA2 33.0 10/31/2024    LABCA2 24.8 02/05/2024    LABCA2 34.0 11/10/2023             IMPRESSION/PLAN    Metastatic ER+/HER2- breast cancer.   Continued response to therapy. Will continue present treatment. RTC in about 3 months for follow-up with Dr Jamar Betancourt with restaging scans, labs and Q12 week zometa     There is no evidence on HPI / clinical exam today for progression of known IV disease.     2. CKD. Monitor, labs stable today    At least 35 minutes of direct consultation was spent with the patient today reviewing her cancer care plan, educating and answering questions regarding ongoing follow up, greater than 50% in counseling and coordination of care          Thank you for the opportunity to be involved your care.   We discussed the clinical significance of diagnosis, goals of care and treatment plan in detail.   Please do not hesitate to reach out with any questions.           -------------------------------------------------------------------------------------------------------------------------------  Gabrielle GOMES  Leandro MSN, APRN, FNP-C  [facility]  Division of Medical Oncology- Breast   Collaborating Physician Dr. Jamar Betancourt   [facility] [place] [address]  Phone: [phone]  Fax: [phone]  Available via Secure Chat    Confidential Peer Review Document-  Privilege  Privileged Pursuant to [place] Revised Code Section 2305.24, .25, .251 & .252

## 2025-01-17 ENCOUNTER — OFFICE VISIT (OUTPATIENT)
Dept: HEMATOLOGY/ONCOLOGY | Facility: HOSPITAL | Age: 75
End: 2025-01-17
Payer: MEDICARE

## 2025-01-17 ENCOUNTER — INFUSION (OUTPATIENT)
Dept: HEMATOLOGY/ONCOLOGY | Facility: HOSPITAL | Age: 75
End: 2025-01-17
Payer: MEDICARE

## 2025-01-17 VITALS
OXYGEN SATURATION: 94 % | HEART RATE: 64 BPM | DIASTOLIC BLOOD PRESSURE: 65 MMHG | TEMPERATURE: 95.9 F | SYSTOLIC BLOOD PRESSURE: 132 MMHG

## 2025-01-17 DIAGNOSIS — Z17.0 MALIGNANT NEOPLASM OF BREAST IN FEMALE, ESTROGEN RECEPTOR POSITIVE, UNSPECIFIED LATERALITY, UNSPECIFIED SITE OF BREAST: ICD-10-CM

## 2025-01-17 DIAGNOSIS — C79.51 CARCINOMA OF RIGHT BREAST METASTATIC TO BONE (MULTI): ICD-10-CM

## 2025-01-17 DIAGNOSIS — Z51.81 ENCOUNTER FOR MONITORING BISPHOSPHONATE THERAPY: ICD-10-CM

## 2025-01-17 DIAGNOSIS — Z17.0 MALIGNANT NEOPLASM OF RIGHT BREAST IN FEMALE, ESTROGEN RECEPTOR POSITIVE, UNSPECIFIED SITE OF BREAST: ICD-10-CM

## 2025-01-17 DIAGNOSIS — C78.02 CARCINOMA OF LEFT BREAST METASTATIC TO LUNG (MULTI): ICD-10-CM

## 2025-01-17 DIAGNOSIS — C50.919 MALIGNANT NEOPLASM OF BREAST IN FEMALE, ESTROGEN RECEPTOR POSITIVE, UNSPECIFIED LATERALITY, UNSPECIFIED SITE OF BREAST: ICD-10-CM

## 2025-01-17 DIAGNOSIS — C50.911 CARCINOMA OF RIGHT BREAST METASTATIC TO BONE (MULTI): ICD-10-CM

## 2025-01-17 DIAGNOSIS — C79.51 CARCINOMA OF BREAST METASTATIC TO BONE, UNSPECIFIED LATERALITY (MULTI): ICD-10-CM

## 2025-01-17 DIAGNOSIS — C50.919 CARCINOMA OF BREAST METASTATIC TO BONE, UNSPECIFIED LATERALITY (MULTI): ICD-10-CM

## 2025-01-17 DIAGNOSIS — Z79.83 ENCOUNTER FOR MONITORING BISPHOSPHONATE THERAPY: ICD-10-CM

## 2025-01-17 DIAGNOSIS — Z79.811 AROMATASE INHIBITOR USE: ICD-10-CM

## 2025-01-17 DIAGNOSIS — C50.912 CARCINOMA OF LEFT BREAST METASTATIC TO LUNG (MULTI): ICD-10-CM

## 2025-01-17 DIAGNOSIS — Z51.11 ENCOUNTER FOR ANTINEOPLASTIC CHEMOTHERAPY: ICD-10-CM

## 2025-01-17 DIAGNOSIS — C50.911 MALIGNANT NEOPLASM OF RIGHT BREAST IN FEMALE, ESTROGEN RECEPTOR POSITIVE, UNSPECIFIED SITE OF BREAST: ICD-10-CM

## 2025-01-17 PROCEDURE — 1157F ADVNC CARE PLAN IN RCRD: CPT | Performed by: NURSE PRACTITIONER

## 2025-01-17 PROCEDURE — 1126F AMNT PAIN NOTED NONE PRSNT: CPT | Performed by: NURSE PRACTITIONER

## 2025-01-17 PROCEDURE — 99215 OFFICE O/P EST HI 40 MIN: CPT | Performed by: NURSE PRACTITIONER

## 2025-01-17 PROCEDURE — 1159F MED LIST DOCD IN RCRD: CPT | Performed by: NURSE PRACTITIONER

## 2025-01-17 PROCEDURE — 2500000004 HC RX 250 GENERAL PHARMACY W/ HCPCS (ALT 636 FOR OP/ED): Performed by: INTERNAL MEDICINE

## 2025-01-17 PROCEDURE — 96365 THER/PROPH/DIAG IV INF INIT: CPT | Mod: INF

## 2025-01-17 PROCEDURE — 1160F RVW MEDS BY RX/DR IN RCRD: CPT | Performed by: NURSE PRACTITIONER

## 2025-01-17 RX ORDER — DIPHENHYDRAMINE HYDROCHLORIDE 50 MG/ML
50 INJECTION INTRAMUSCULAR; INTRAVENOUS AS NEEDED
OUTPATIENT
Start: 2025-01-27

## 2025-01-17 RX ORDER — EPINEPHRINE 0.3 MG/.3ML
0.3 INJECTION SUBCUTANEOUS EVERY 5 MIN PRN
OUTPATIENT
Start: 2025-01-27

## 2025-01-17 RX ORDER — EPINEPHRINE 0.3 MG/.3ML
0.3 INJECTION SUBCUTANEOUS EVERY 5 MIN PRN
Status: DISCONTINUED | OUTPATIENT
Start: 2025-01-17 | End: 2025-01-17 | Stop reason: HOSPADM

## 2025-01-17 RX ORDER — HEPARIN SODIUM,PORCINE/PF 10 UNIT/ML
50 SYRINGE (ML) INTRAVENOUS AS NEEDED
OUTPATIENT
Start: 2025-01-17

## 2025-01-17 RX ORDER — FAMOTIDINE 10 MG/ML
20 INJECTION INTRAVENOUS ONCE AS NEEDED
OUTPATIENT
Start: 2025-01-27

## 2025-01-17 RX ORDER — ZOLEDRONIC ACID 0.04 MG/ML
4 INJECTION, SOLUTION INTRAVENOUS ONCE
OUTPATIENT
Start: 2025-01-27

## 2025-01-17 RX ORDER — HEPARIN 100 UNIT/ML
500 SYRINGE INTRAVENOUS AS NEEDED
OUTPATIENT
Start: 2025-01-17

## 2025-01-17 RX ORDER — ALBUTEROL SULFATE 0.83 MG/ML
3 SOLUTION RESPIRATORY (INHALATION) AS NEEDED
Status: DISCONTINUED | OUTPATIENT
Start: 2025-01-17 | End: 2025-01-17 | Stop reason: HOSPADM

## 2025-01-17 RX ORDER — ALBUTEROL SULFATE 0.83 MG/ML
3 SOLUTION RESPIRATORY (INHALATION) AS NEEDED
OUTPATIENT
Start: 2025-01-27

## 2025-01-17 RX ORDER — FAMOTIDINE 10 MG/ML
20 INJECTION INTRAVENOUS ONCE AS NEEDED
Status: DISCONTINUED | OUTPATIENT
Start: 2025-01-17 | End: 2025-01-17 | Stop reason: HOSPADM

## 2025-01-17 RX ORDER — ZOLEDRONIC ACID 0.04 MG/ML
4 INJECTION, SOLUTION INTRAVENOUS ONCE
Status: COMPLETED | OUTPATIENT
Start: 2025-01-17 | End: 2025-01-17

## 2025-01-17 RX ORDER — DIPHENHYDRAMINE HYDROCHLORIDE 50 MG/ML
50 INJECTION INTRAMUSCULAR; INTRAVENOUS AS NEEDED
Status: DISCONTINUED | OUTPATIENT
Start: 2025-01-17 | End: 2025-01-17 | Stop reason: HOSPADM

## 2025-01-17 RX ORDER — LETROZOLE 2.5 MG/1
2.5 TABLET, FILM COATED ORAL DAILY
Qty: 90 TABLET | Refills: 3 | Status: SHIPPED | OUTPATIENT
Start: 2025-01-17 | End: 2026-01-17

## 2025-01-17 RX ADMIN — ZOLEDRONIC ACID 4 MG: 0.04 INJECTION, SOLUTION INTRAVENOUS at 15:05

## 2025-01-17 ASSESSMENT — PAIN SCALES - GENERAL: PAINLEVEL_OUTOF10: 0-NO PAIN

## 2025-01-17 NOTE — PATIENT INSTRUCTIONS
Dr Jamar Betancourt follow up with labs and scan review and next Zometa 5/12/25- you may move this all out a week per your preference     Please call [phone] with any questions or concerns prior to your next office visit.    Faxed to [phone]

## 2025-01-17 NOTE — PROGRESS NOTES
Patient presents to infusion appointment from clinic in stable condition. Zometa infusion tolerated without incident. Discharged in stable condition

## 2025-01-20 ENCOUNTER — TELEPHONE (OUTPATIENT)
Dept: ADMISSION | Facility: HOSPITAL | Age: 75
End: 2025-01-20
Payer: MEDICARE

## 2025-01-20 DIAGNOSIS — Z51.5 PALLIATIVE CARE ENCOUNTER: ICD-10-CM

## 2025-01-20 DIAGNOSIS — G89.3 CANCER RELATED PAIN: ICD-10-CM

## 2025-01-20 RX ORDER — PREGABALIN 50 MG/1
50 CAPSULE ORAL 3 TIMES DAILY
Qty: 270 CAPSULE | Refills: 0 | Status: SHIPPED | OUTPATIENT
Start: 2025-01-20 | End: 2025-04-20

## 2025-01-20 NOTE — TELEPHONE ENCOUNTER
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 Per DAVE Farias, patient will need to ask a provider in [place] to take over this prescription. I left a VM for patient making her aware.

## 2025-01-20 NOTE — TELEPHONE ENCOUNTER
Pt called to request a refill of pregabalin 50mg TID #270.  She stated her pharmacy did not receive recent prescription.  Preferred pharmacy is [facility] in [place], [place].    [facility] confirmed they did not receive prescription sent in 1/10/25.

## 2025-01-30 ENCOUNTER — SPECIALTY PHARMACY (OUTPATIENT)
Dept: PHARMACY | Facility: CLINIC | Age: 75
End: 2025-01-30

## 2025-02-03 ENCOUNTER — TELEPHONE (OUTPATIENT)
Dept: ADMISSION | Facility: HOSPITAL | Age: 75
End: 2025-02-03
Payer: MEDICARE

## 2025-02-03 ENCOUNTER — SPECIALTY PHARMACY (OUTPATIENT)
Dept: PHARMACY | Facility: CLINIC | Age: 75
End: 2025-02-03

## 2025-02-03 DIAGNOSIS — C50.919 CARCINOMA OF BREAST METASTATIC TO BONE, UNSPECIFIED LATERALITY (MULTI): ICD-10-CM

## 2025-02-03 DIAGNOSIS — C79.51 CARCINOMA OF BREAST METASTATIC TO BONE, UNSPECIFIED LATERALITY (MULTI): ICD-10-CM

## 2025-02-03 PROCEDURE — RXMED WILLOW AMBULATORY MEDICATION CHARGE

## 2025-02-03 NOTE — TELEPHONE ENCOUNTER
Pt called to request to have Ibrance prescription re-sent to [facility].  It was routed to [facility] retail pharmacy in error 1/17.

## 2025-02-04 ENCOUNTER — PHARMACY VISIT (OUTPATIENT)
Dept: PHARMACY | Facility: CLINIC | Age: 75
End: 2025-02-04
Payer: COMMERCIAL

## 2025-02-12 ENCOUNTER — ONCOLOGY MEDICATION OUTREACH (OUTPATIENT)
Dept: HEMATOLOGY/ONCOLOGY | Facility: CLINIC | Age: 75
End: 2025-02-12
Payer: MEDICARE

## 2025-02-12 ENCOUNTER — APPOINTMENT (OUTPATIENT)
Dept: HEMATOLOGY/ONCOLOGY | Facility: CLINIC | Age: 75
End: 2025-02-12
Payer: MEDICARE

## 2025-02-14 DIAGNOSIS — C50.911 MALIGNANT NEOPLASM OF RIGHT BREAST IN FEMALE, ESTROGEN RECEPTOR POSITIVE, UNSPECIFIED SITE OF BREAST: ICD-10-CM

## 2025-02-14 DIAGNOSIS — Z17.0 MALIGNANT NEOPLASM OF RIGHT BREAST IN FEMALE, ESTROGEN RECEPTOR POSITIVE, UNSPECIFIED SITE OF BREAST: ICD-10-CM

## 2025-02-14 NOTE — TELEPHONE ENCOUNTER
Confirmed with pharmacy in [place] that letrozole script is on file and they will fill.   Pt updated

## 2025-02-26 ENCOUNTER — SPECIALTY PHARMACY (OUTPATIENT)
Dept: PHARMACY | Facility: CLINIC | Age: 75
End: 2025-02-26

## 2025-02-26 PROCEDURE — RXMED WILLOW AMBULATORY MEDICATION CHARGE

## 2025-02-27 ENCOUNTER — PHARMACY VISIT (OUTPATIENT)
Dept: PHARMACY | Facility: CLINIC | Age: 75
End: 2025-02-27
Payer: COMMERCIAL

## 2025-03-22 ENCOUNTER — SPECIALTY PHARMACY (OUTPATIENT)
Dept: PHARMACY | Facility: CLINIC | Age: 75
End: 2025-03-22

## 2025-03-22 PROCEDURE — RXMED WILLOW AMBULATORY MEDICATION CHARGE

## 2025-03-26 ENCOUNTER — PHARMACY VISIT (OUTPATIENT)
Dept: PHARMACY | Facility: CLINIC | Age: 75
End: 2025-03-26
Payer: COMMERCIAL

## 2025-04-07 ENCOUNTER — APPOINTMENT (OUTPATIENT)
Dept: HEMATOLOGY/ONCOLOGY | Facility: HOSPITAL | Age: 75
End: 2025-04-07
Payer: MEDICARE

## 2025-04-17 ENCOUNTER — TELEPHONE (OUTPATIENT)
Dept: ADMISSION | Facility: HOSPITAL | Age: 75
End: 2025-04-17
Payer: MEDICARE

## 2025-04-17 ENCOUNTER — SPECIALTY PHARMACY (OUTPATIENT)
Dept: PHARMACY | Facility: CLINIC | Age: 75
End: 2025-04-17

## 2025-04-17 DIAGNOSIS — G89.3 CANCER RELATED PAIN: ICD-10-CM

## 2025-04-17 PROCEDURE — RXMED WILLOW AMBULATORY MEDICATION CHARGE

## 2025-04-17 RX ORDER — DULOXETIN HYDROCHLORIDE 60 MG/1
60 CAPSULE, DELAYED RELEASE ORAL NIGHTLY
Qty: 90 CAPSULE | Refills: 0 | Status: SHIPPED | OUTPATIENT
Start: 2025-04-17 | End: 2025-07-16

## 2025-04-17 RX ORDER — DULOXETIN HYDROCHLORIDE 30 MG/1
30 CAPSULE, DELAYED RELEASE ORAL NIGHTLY
Qty: 90 CAPSULE | Refills: 0 | Status: SHIPPED | OUTPATIENT
Start: 2025-04-17 | End: 2025-07-16

## 2025-04-17 NOTE — TELEPHONE ENCOUNTER
Reason for Conversation  Med Refill    Background   Refills pended to provider to approve and send.    Disposition   No disposition on file.

## 2025-04-17 NOTE — TELEPHONE ENCOUNTER
Pt is requesting refills of the following:  Duloxetine 30mg at bedtime, #90  Duloxetine 60mg at bedtime, #90  Preferred pharmacy is [facility] in [place], [place].

## 2025-04-22 ENCOUNTER — TELEPHONE (OUTPATIENT)
Dept: ADMISSION | Facility: HOSPITAL | Age: 75
End: 2025-04-22
Payer: MEDICARE

## 2025-04-22 ENCOUNTER — PHARMACY VISIT (OUTPATIENT)
Dept: PHARMACY | Facility: CLINIC | Age: 75
End: 2025-04-22
Payer: COMMERCIAL

## 2025-04-22 DIAGNOSIS — G89.3 CANCER RELATED PAIN: ICD-10-CM

## 2025-04-22 DIAGNOSIS — Z51.5 PALLIATIVE CARE ENCOUNTER: ICD-10-CM

## 2025-04-22 RX ORDER — PREGABALIN 50 MG/1
50 CAPSULE ORAL 3 TIMES DAILY
Qty: 270 CAPSULE | Refills: 0 | Status: SHIPPED | OUTPATIENT
Start: 2025-04-22 | End: 2025-07-21

## 2025-04-22 NOTE — TELEPHONE ENCOUNTER
Reason for Conversation  Med Refill    Background + Disposition  OARRS report reviewed and reflects  prescription history, no aberrancy noted. Per OARRS, patient last filled day supply. Per last visit with Diana Farias on 11/15 patient to continue lyrica 50mg TID. Patient with follow up visit scheduled with Diana on 5/16. Patient updated that medication will be sent to [facility]. Refill request routed to covering provider.

## 2025-04-22 NOTE — TELEPHONE ENCOUNTER
Gracy Espino called the refill line for Pregabalin. Would like refills to be sent to [facility] pharmacy on file. Message sent to Palliative Care team to send in.

## 2025-05-12 ENCOUNTER — APPOINTMENT (OUTPATIENT)
Dept: RADIOLOGY | Facility: HOSPITAL | Age: 75
End: 2025-05-12
Payer: MEDICARE

## 2025-05-12 ENCOUNTER — APPOINTMENT (OUTPATIENT)
Dept: HEMATOLOGY/ONCOLOGY | Facility: HOSPITAL | Age: 75
End: 2025-05-12
Payer: MEDICARE

## 2025-05-14 LAB
25(OH)D3+25(OH)D2 SERPL-MCNC: 63 NG/ML (ref 30–100)
ALBUMIN SERPL-MCNC: 4 G/DL (ref 3.6–5.1)
ALBUMIN/GLOB SERPL: 1.3 (CALC) (ref 1–2.5)
ALP SERPL-CCNC: 75 U/L (ref 37–153)
ALT SERPL-CCNC: 10 U/L (ref 6–29)
APPEARANCE UR: CLEAR
AST SERPL-CCNC: 15 U/L (ref 10–35)
BACTERIA #/AREA URNS HPF: ABNORMAL /HPF
BACTERIA UR CULT: NORMAL
BILIRUB SERPL-MCNC: 0.5 MG/DL (ref 0.2–1.2)
BILIRUB UR QL STRIP: NEGATIVE
BUN SERPL-MCNC: 29 MG/DL (ref 7–25)
BUN/CREAT SERPL: 27 (CALC) (ref 6–22)
CALCIUM SERPL-MCNC: 9.4 MG/DL (ref 8.6–10.4)
CANCER AG27-29 SERPL-ACNC: 36 U/ML
CHLORIDE SERPL-SCNC: 101 MMOL/L (ref 98–110)
CHOLEST SERPL-MCNC: 170 MG/DL
CHOLEST/HDLC SERPL: 3.9 (CALC)
CO2 SERPL-SCNC: 27 MMOL/L (ref 20–32)
COLOR UR: YELLOW
CREAT SERPL-MCNC: 1.08 MG/DL (ref 0.6–1)
EGFRCR SERPLBLD CKD-EPI 2021: 54 ML/MIN/1.73M2
ERYTHROCYTE [DISTWIDTH] IN BLOOD BY AUTOMATED COUNT: 14.4 % (ref 11–15)
GLOBULIN SER CALC-MCNC: 3.1 G/DL (CALC) (ref 1.9–3.7)
GLUCOSE SERPL-MCNC: 124 MG/DL (ref 65–99)
GLUCOSE UR QL STRIP: NEGATIVE
HCT VFR BLD AUTO: 36.8 % (ref 35–45)
HDLC SERPL-MCNC: 44 MG/DL
HGB BLD-MCNC: 12.3 G/DL (ref 11.7–15.5)
HGB UR QL STRIP: NEGATIVE
HYALINE CASTS #/AREA URNS LPF: ABNORMAL /LPF
KETONES UR QL STRIP: NEGATIVE
LDLC SERPL CALC-MCNC: 98 MG/DL (CALC)
LEUKOCYTE ESTERASE UR QL STRIP: ABNORMAL
MCH RBC QN AUTO: 36.5 PG (ref 27–33)
MCHC RBC AUTO-ENTMCNC: 33.4 G/DL (ref 32–36)
MCV RBC AUTO: 109.2 FL (ref 80–100)
NITRITE UR QL STRIP: NEGATIVE
NONHDLC SERPL-MCNC: 126 MG/DL (CALC)
PH UR STRIP: 5.5 [PH] (ref 5–8)
PLATELET # BLD AUTO: 223 THOUSAND/UL (ref 140–400)
PMV BLD REES-ECKER: 9.3 FL (ref 7.5–12.5)
POTASSIUM SERPL-SCNC: 4.2 MMOL/L (ref 3.5–5.3)
PROT SERPL-MCNC: 7.1 G/DL (ref 6.1–8.1)
PROT UR QL STRIP: NEGATIVE
RBC # BLD AUTO: 3.37 MILLION/UL (ref 3.8–5.1)
RBC #/AREA URNS HPF: ABNORMAL /HPF
SERVICE CMNT-IMP: ABNORMAL
SODIUM SERPL-SCNC: 137 MMOL/L (ref 135–146)
SP GR UR STRIP: 1.02 (ref 1–1.03)
SQUAMOUS #/AREA URNS HPF: ABNORMAL /HPF
T4 FREE SERPL-MCNC: 1.2 NG/DL (ref 0.8–1.8)
TRIGL SERPL-MCNC: 187 MG/DL
TSH SERPL-ACNC: 2.85 MIU/L (ref 0.4–4.5)
URATE SERPL-MCNC: 7.3 MG/DL (ref 2.5–7)
WBC # BLD AUTO: 3.2 THOUSAND/UL (ref 3.8–10.8)
WBC #/AREA URNS HPF: ABNORMAL /HPF

## 2025-05-16 ENCOUNTER — APPOINTMENT (OUTPATIENT)
Dept: PALLIATIVE MEDICINE | Facility: CLINIC | Age: 75
End: 2025-05-16
Payer: MEDICARE

## 2025-05-16 ENCOUNTER — HOSPITAL ENCOUNTER (OUTPATIENT)
Dept: RADIOLOGY | Facility: HOSPITAL | Age: 75
Discharge: HOME | End: 2025-05-16
Payer: MEDICARE

## 2025-05-16 ENCOUNTER — OFFICE VISIT (OUTPATIENT)
Dept: PALLIATIVE MEDICINE | Facility: CLINIC | Age: 75
End: 2025-05-16
Payer: MEDICARE

## 2025-05-16 VITALS
BODY MASS INDEX: 27.61 KG/M2 | DIASTOLIC BLOOD PRESSURE: 63 MMHG | SYSTOLIC BLOOD PRESSURE: 107 MMHG | OXYGEN SATURATION: 96 % | TEMPERATURE: 95 F | RESPIRATION RATE: 18 BRPM | WEIGHT: 178.6 LBS | HEART RATE: 64 BPM

## 2025-05-16 DIAGNOSIS — C78.02 CARCINOMA OF LEFT BREAST METASTATIC TO LUNG: ICD-10-CM

## 2025-05-16 DIAGNOSIS — G89.3 CANCER RELATED PAIN: ICD-10-CM

## 2025-05-16 DIAGNOSIS — Z51.5 PALLIATIVE CARE ENCOUNTER: ICD-10-CM

## 2025-05-16 DIAGNOSIS — G62.0 PERIPHERAL NEUROPATHY DUE TO CHEMOTHERAPY (MULTI): ICD-10-CM

## 2025-05-16 DIAGNOSIS — C50.919 CARCINOMA OF BREAST METASTATIC TO BONE, UNSPECIFIED LATERALITY: Primary | ICD-10-CM

## 2025-05-16 DIAGNOSIS — C79.51 CARCINOMA OF BREAST METASTATIC TO BONE, UNSPECIFIED LATERALITY: Primary | ICD-10-CM

## 2025-05-16 DIAGNOSIS — C50.912 CARCINOMA OF LEFT BREAST METASTATIC TO LUNG: ICD-10-CM

## 2025-05-16 DIAGNOSIS — T45.1X5A PERIPHERAL NEUROPATHY DUE TO CHEMOTHERAPY (MULTI): ICD-10-CM

## 2025-05-16 PROCEDURE — 1159F MED LIST DOCD IN RCRD: CPT

## 2025-05-16 PROCEDURE — 1126F AMNT PAIN NOTED NONE PRSNT: CPT

## 2025-05-16 PROCEDURE — 2550000001 HC RX 255 CONTRASTS: Performed by: INTERNAL MEDICINE

## 2025-05-16 PROCEDURE — 71260 CT THORAX DX C+: CPT

## 2025-05-16 PROCEDURE — 1036F TOBACCO NON-USER: CPT

## 2025-05-16 PROCEDURE — 99214 OFFICE O/P EST MOD 30 MIN: CPT

## 2025-05-16 RX ORDER — PREGABALIN 75 MG/1
75 CAPSULE ORAL 3 TIMES DAILY
Qty: 270 CAPSULE | Refills: 0 | Status: SHIPPED | OUTPATIENT
Start: 2025-05-16 | End: 2025-08-14

## 2025-05-16 RX ADMIN — IOHEXOL 75 ML: 350 INJECTION, SOLUTION INTRAVENOUS at 10:18

## 2025-05-16 ASSESSMENT — ENCOUNTER SYMPTOMS
DEPRESSION: 0
OCCASIONAL FEELINGS OF UNSTEADINESS: 0
LOSS OF SENSATION IN FEET: 0

## 2025-05-16 ASSESSMENT — PAIN SCALES - GENERAL: PAINLEVEL_OUTOF10: 0-NO PAIN

## 2025-05-16 NOTE — PROGRESS NOTES
"  SUPPORTIVE AND PALLIATIVE ONCOLOGY OUTPATIENT FOLLOW-UP      SERVICE DATE: 5/16/2025    Subjective   HISTORY OF PRESENT ILLNESS: Gracy Espino is a 74 y.o. female who presents with who presents with  hx. of recurrent metastatic breast cancer (other sites: LN, pleura, bone). She is currently on Letrozole + palbociclib.        Pain Assessment:  Pain Score: 4  Location:  bilateral hands and feet - primarily in R hand  Education:  current pain regimen     Symptom Assessment:  Pain:somewhat- lymphedema, CIPN- related, worse in R hand with new sleeve  Headache: none  Dizziness:none  Lack of energy: a little  Difficulty sleeping: none  Worrying: none  Anxiety: none  Depression: none  Shortness of breath: none  Lack of appetite: none   Nausea: none  Vomiting: none  Constipation: none  Diarrhea: none  Numbness or tingling in hands/feet/other: somewhat- stable  Weight loss: none    Information obtained from: chart review and interview of patient  ______________________________________________________________________        Objective   No results found for this or any previous visit (from the past 96 hours).             PHYSICAL EXAMINATION   Vital Signs:       8/7/2024    11:17 AM 9/6/2024    10:07 AM 11/6/2024    11:04 AM 11/6/2024    12:42 PM 11/15/2024    10:15 AM 1/17/2025     1:00 PM 5/16/2025     1:01 PM   Vitals   Systolic 123 122 132  112 132 107   Diastolic 75 72 75  58 65 63   BP Location Left arm  Left arm   Left arm    Heart Rate 77 69 68  68 64 64   Temp 36.7 °C (98.1 °F) 36.2 °C (97.2 °F) 36.2 °C (97.2 °F)  36.1 °C (97 °F) 35.5 °C (95.9 °F) 35 °C (95 °F)   Resp  18   18  18   Height    1.713 m (5' 7.44\")       Weight (lb) 182.43 181.77 178.46  177.91  178.6   BMI 26.5 kg/m2 26.41 kg/m2 25.93 kg/m2 27.59 kg/m2 27.5 kg/m2  27.61 kg/m2   BSA (m2) 2.02 m2 2.01 m2 1.99 m2 1.96 m2 1.96 m2  1.96 m2   Visit Report Report Report Report Report Report Report Report       Significant value        Physical " Exam  Constitutional:       Appearance: She is normal weight.   HENT:      Head: Normocephalic.      Mouth/Throat:      Mouth: Mucous membranes are moist.      Pharynx: Oropharynx is clear.   Eyes:      Conjunctiva/sclera: Conjunctivae normal.      Pupils: Pupils are equal, round, and reactive to light.   Pulmonary:      Effort: Pulmonary effort is normal.   Abdominal:      General: Abdomen is flat.   Musculoskeletal:         General: Swelling present. Normal range of motion.      Cervical back: Normal range of motion.      Comments: Right arm lymphedema      Skin:     General: Skin is warm and dry.   Neurological:      General: No focal deficit present.      Mental Status: She is alert.   Psychiatric:         Mood and Affect: Mood normal.         Behavior: Behavior normal.         ASSESSMENT/PLAN    Pain  Pain is: cancer related pain  Type: neuropathic; CIPN  Pain control: sub-optimally controlled  Home regimen:   Duloxetine 90mg daily with dinner  Tramadol 50mg q8h as needed (takes ~1x/day to good relief)  Up Pregabalin to 75mg TID- instructed to up-titrate nighttime dose first   Intolerances/previously tried: Gabapentin (felt sleepy)  Educated that it is okay to occasionally pre-medicate activity with Tramadol, especially with ongoing goal to reduce sedentary behavior.  Encouraged to increase activity- exercise can be beneficial for both CIPN and constipation- has access to a water aerobics club/class in [place] and the [employer] in [place], encouraged her to join as this may be tolerable exercise that can feel good on the body; reiterated today  Referral 5/15/24 to Integrative Oncology- started acupuncture (unsure of benefit) and massage (had several sessions)    Overdose Risk Score:020    Constipation  At risk for constipation related to opioids,   intermittent constipation  Encouraged to take OTC stool softener daily and call if this is note helpful.  Encouraged adequate activity & hydration to promote bowel  motility.     Sleeping Difficulty:  Impaired sleep related to CIPN  Home regimen:  see pain notes     Supportive Interventions: n/a- will continue to evaluate needs     Supportive and Palliative Oncology encounter:  Spoke with patient and Michael via virtual platform  Emotional support provided  Coordination of care  We will continue to follow and address symptoms as needed     Medical Decision Making/Goals of Care/Advance Care Planning:  Patient's current clinical condition, including diagnosis, prognosis, and management plan, and goals of care were discussed.   Life limiting disease: metastatic malignancy  Family: Supportive   Performance status: Major limitations due to pain  Goals: symptom control and cancer directed therapy     Advance Directives  Existence of Advance Directives:Yes, documentation or copy in medical record  Decision maker: MARCELAOA is Michael garcia  Code Status: Full code    Next Follow-Up Visit:  Return to clinic in 3 months    Signature and billing  Medical complexity was moderate level due to due to complexity of problems, extensive data review, and high risk of management/treatment.  Time was spent on the following: Prep Time, Time Directly with Patient/Family/Caregiver, Documentation Time. Total time spent: 35minutes      Data  Diagnostic tests and information reviewed for today's visit:  Most recent labs and imaging results, Medications     5/16/25: changes to plan indicated in bold. Continue all other regimens as listed.      Some elements copied from Supportive Oncology note on 9/6/24 , the elements have been updated and all reflect current decision making from today, 5/16/2025.      Plan of Care discussed with: Patient, Family/Significant Other: , and RN    SIGNATURE: DAVE Brito-CNP    Contact information:  Supportive and Palliative Oncology  Monday-Friday 8 AM-5 PM  Phone:  [phone], press option #5, then option #1.   Or Epic Secure Chat       I,  Diana Farias, DAVE-BISI, attest to the above assessment and plan.

## 2025-05-19 ENCOUNTER — OFFICE VISIT (OUTPATIENT)
Dept: HEMATOLOGY/ONCOLOGY | Facility: HOSPITAL | Age: 75
End: 2025-05-19
Payer: MEDICARE

## 2025-05-19 ENCOUNTER — INFUSION (OUTPATIENT)
Dept: HEMATOLOGY/ONCOLOGY | Facility: HOSPITAL | Age: 75
End: 2025-05-19
Payer: MEDICARE

## 2025-05-19 ENCOUNTER — ONCOLOGY MEDICATION OUTREACH (OUTPATIENT)
Dept: HEMATOLOGY/ONCOLOGY | Facility: CLINIC | Age: 75
End: 2025-05-19

## 2025-05-19 VITALS
WEIGHT: 178.35 LBS | HEIGHT: 67 IN | TEMPERATURE: 97.5 F | HEART RATE: 62 BPM | DIASTOLIC BLOOD PRESSURE: 66 MMHG | BODY MASS INDEX: 27.99 KG/M2 | SYSTOLIC BLOOD PRESSURE: 122 MMHG | RESPIRATION RATE: 18 BRPM | OXYGEN SATURATION: 97 %

## 2025-05-19 DIAGNOSIS — C78.02 CARCINOMA OF LEFT BREAST METASTATIC TO LUNG: ICD-10-CM

## 2025-05-19 DIAGNOSIS — C50.912 CARCINOMA OF LEFT BREAST METASTATIC TO LUNG: ICD-10-CM

## 2025-05-19 DIAGNOSIS — Z17.0 MALIGNANT NEOPLASM OF BREAST IN FEMALE, ESTROGEN RECEPTOR POSITIVE, UNSPECIFIED LATERALITY, UNSPECIFIED SITE OF BREAST: ICD-10-CM

## 2025-05-19 DIAGNOSIS — C50.919 MALIGNANT NEOPLASM OF BREAST IN FEMALE, ESTROGEN RECEPTOR POSITIVE, UNSPECIFIED LATERALITY, UNSPECIFIED SITE OF BREAST: ICD-10-CM

## 2025-05-19 PROCEDURE — 1159F MED LIST DOCD IN RCRD: CPT | Performed by: INTERNAL MEDICINE

## 2025-05-19 PROCEDURE — 1126F AMNT PAIN NOTED NONE PRSNT: CPT | Performed by: INTERNAL MEDICINE

## 2025-05-19 PROCEDURE — G2211 COMPLEX E/M VISIT ADD ON: HCPCS | Performed by: INTERNAL MEDICINE

## 2025-05-19 PROCEDURE — 2500000004 HC RX 250 GENERAL PHARMACY W/ HCPCS (ALT 636 FOR OP/ED): Mod: JZ | Performed by: INTERNAL MEDICINE

## 2025-05-19 PROCEDURE — 99214 OFFICE O/P EST MOD 30 MIN: CPT | Performed by: INTERNAL MEDICINE

## 2025-05-19 PROCEDURE — 96365 THER/PROPH/DIAG IV INF INIT: CPT | Mod: INF

## 2025-05-19 PROCEDURE — 1160F RVW MEDS BY RX/DR IN RCRD: CPT | Performed by: INTERNAL MEDICINE

## 2025-05-19 RX ORDER — DIPHENHYDRAMINE HYDROCHLORIDE 50 MG/ML
50 INJECTION, SOLUTION INTRAMUSCULAR; INTRAVENOUS AS NEEDED
OUTPATIENT
Start: 2025-06-29

## 2025-05-19 RX ORDER — EPINEPHRINE 0.3 MG/.3ML
0.3 INJECTION SUBCUTANEOUS EVERY 5 MIN PRN
OUTPATIENT
Start: 2025-06-29

## 2025-05-19 RX ORDER — ZOLEDRONIC ACID 0.04 MG/ML
4 INJECTION, SOLUTION INTRAVENOUS ONCE
Status: COMPLETED | OUTPATIENT
Start: 2025-05-19 | End: 2025-05-19

## 2025-05-19 RX ORDER — FAMOTIDINE 10 MG/ML
20 INJECTION, SOLUTION INTRAVENOUS ONCE AS NEEDED
OUTPATIENT
Start: 2025-06-29

## 2025-05-19 RX ORDER — ALBUTEROL SULFATE 0.83 MG/ML
3 SOLUTION RESPIRATORY (INHALATION) AS NEEDED
OUTPATIENT
Start: 2025-06-29

## 2025-05-19 RX ORDER — HEPARIN SODIUM,PORCINE/PF 10 UNIT/ML
50 SYRINGE (ML) INTRAVENOUS AS NEEDED
OUTPATIENT
Start: 2025-05-19

## 2025-05-19 RX ORDER — ZOLEDRONIC ACID 0.04 MG/ML
4 INJECTION, SOLUTION INTRAVENOUS ONCE
OUTPATIENT
Start: 2025-06-29

## 2025-05-19 RX ORDER — HEPARIN 100 UNIT/ML
500 SYRINGE INTRAVENOUS AS NEEDED
OUTPATIENT
Start: 2025-05-19

## 2025-05-19 RX ADMIN — ZOLEDRONIC ACID 4 MG: 0.04 INJECTION, SOLUTION INTRAVENOUS at 12:06

## 2025-05-19 ASSESSMENT — PAIN SCALES - GENERAL: PAINLEVEL_OUTOF10: 0-NO PAIN

## 2025-05-19 NOTE — PATIENT INSTRUCTIONS
3 month follow-up with Gabrielle, labs and zometa  CT scan showed stable disease  Please call us at [phone] option 5 then option 2 with any questions or concerns

## 2025-05-19 NOTE — PROGRESS NOTES
Pt here for Zometa infusion she declined 500 ml NS bolus. She tolerated infusion well and has her next appointment scheduled. She left ambulatory in [facility].

## 2025-05-20 ASSESSMENT — ENCOUNTER SYMPTOMS
RESPIRATORY NEGATIVE: 1
BRUISES/BLEEDS EASILY: 0
SEIZURES: 0
CARDIOVASCULAR NEGATIVE: 1
NUMBNESS: 0
DEPRESSION: 0
FREQUENCY: 0
DIARRHEA: 0
PALPITATIONS: 0
NAUSEA: 0
LEG SWELLING: 0
CONSTIPATION: 0
CONSTITUTIONAL NEGATIVE: 1
NERVOUS/ANXIOUS: 0
FATIGUE: 0
CHILLS: 0
ARTHRALGIAS: 0
COUGH: 0
DYSURIA: 0
HEADACHES: 0
HEMATURIA: 0
APPETITE CHANGE: 0
EXTREMITY WEAKNESS: 0
CONFUSION: 0
HEMOPTYSIS: 0
ABDOMINAL PAIN: 0
FEVER: 0
DIFFICULTY URINATING: 0
DIZZINESS: 0
BLOOD IN STOOL: 0
EYE PROBLEMS: 0
WHEEZING: 0
SCLERAL ICTERUS: 0
CHEST TIGHTNESS: 0
BACK PAIN: 0
ADENOPATHY: 0
ABDOMINAL DISTENTION: 0
SLEEP DISTURBANCE: 0
SHORTNESS OF BREATH: 0
DIAPHORESIS: 0
HOT FLASHES: 0
EYES NEGATIVE: 1
MYALGIAS: 0

## 2025-05-20 NOTE — PROGRESS NOTES
Breast Medical Oncology Clinic  Location: [facility]      BREAST CANCER DIAGNOSIS  Carcinoma of breast metastatic to bone, Clinical: cM1b    Carcinoma of left breast metastatic to lung, Clinical: Stage IV (pM1)       ONCOLOGIC HISTORY (from Dr Garber's note 5/16/24):  Diagnostic/Therapeutic History:  Diagnosis: Recurrent/metastatic breast cancer.  ER >95% AK 10% Her2-negative (0 IHC).  Sites of Disease: LN's, pleura, bone.  NGS: PIK3CA     -2007: Right T1c N1a ER/AK+ Her2- breast cancer.  -TAC x6 cycles.  -RT completed 3/2008.  -Completed 5 years of anastrozole 4/2013. Two more years, stopped 6/2015.  -1/7/2023: Presented to ER with dyspnea and right lymphedema. CTA showed large right-sided pleural effusion, small left pleural effusion, nodular opacities in MARITZA and lingual, right axillary soft tissue lesion (3.7 x 4.1 cm) with nodular opacities in  subcutaneous tissues of right chest wall.  -1/13/23: PET/CT showed hypermetabolic disease in multiple supraclavicular, axillary, mediastinal, hilar, periaortic LN’s. Osseous lesions in axial and appendicular skeleton, pleural nodularities in right hemithorax, focus of activity in right  posterior triceps muscle.  -1/13/23: RUE MRI confirmed right masslike axillary LAD with mass effect on the neurovascular bundle without occlusion.  -1/20/23: Right axillary biopsy confirmed metastatic IDC, ER >95% AK 10% Her2-negative (0 by IHC). NGS testing showed PIK3CA  mutation.  -1/2023: Letrozole initiated.  -2/7/23: Palbociclib initiated.  -5/11/23: Zoledronic acid initiated (q12 weeks).    CURRENT THERAPY  Letrozole/palbociclib since 2/2023    HISTORY OF PRESENT ILLNESS    Gracy Espino is a 74 y.o. woman previously cared for by Dr Garber and her 2nd visit with me. Overall tolerating treatment well. Recent CT from this week showed stable disease in right axilla and stable disease in the bone.            Review of Systems   Constitutional: Negative.  Negative for appetite change,  "chills, diaphoresis, fatigue and fever.   HENT:  Negative.  Negative for hearing loss and lump/mass.    Eyes: Negative.  Negative for eye problems and icterus.   Respiratory: Negative.  Negative for chest tightness, cough, hemoptysis, shortness of breath and wheezing.    Cardiovascular: Negative.  Negative for chest pain, leg swelling and palpitations.   Gastrointestinal:  Negative for abdominal distention, abdominal pain, blood in stool, constipation, diarrhea and nausea.   Endocrine: Negative for hot flashes.   Genitourinary:  Negative for bladder incontinence, difficulty urinating, dyspareunia, dysuria, frequency and hematuria.    Musculoskeletal:  Negative for arthralgias, back pain, gait problem and myalgias.   Neurological:  Negative for dizziness, extremity weakness, gait problem, headaches, numbness and seizures.   Hematological:  Negative for adenopathy. Does not bruise/bleed easily.   Psychiatric/Behavioral:  Negative for confusion, depression and sleep disturbance. The patient is not nervous/anxious.    All other systems reviewed and are negative.        Past Medical History:  has a past medical history of Breast cancer, Disease of thyroid gland, Hypertension, Lymphedema, and Neuropathy.  Surgical History:   has no past surgical history on file.  Social History:   reports that she has never smoked. She has never used smokeless tobacco. She reports current alcohol use of about 2.0 standard drinks of alcohol per week. She reports that she does not use drugs.  Family History:  No family history on file.  Family Oncology History:  Cancer-related family history is not on file.      OBJECTIVE    VS / Pain:  /66   Pulse 62   Temp 36.4 °C (97.5 °F) (Temporal)   Resp 18   Ht 1.713 m (5' 7.44\")   Wt 80.9 kg (178 lb 5.6 oz)   SpO2 97%   BMI 27.57 kg/m²   BSA: 1.96 meters squared   Pain Scale: 2    Performance Status:  The ECOG performance scale today is ECO- Restricted in physically strenuous " activity.  Carries out light duty.    Physical Exam  Constitutional:       General: She is not in acute distress.     Appearance: She is not ill-appearing, toxic-appearing or diaphoretic.   HENT:      Nose: No congestion or rhinorrhea.      Mouth/Throat:      Pharynx: No posterior oropharyngeal erythema.   Cardiovascular:      Rate and Rhythm: Normal rate and regular rhythm.      Pulses: Normal pulses.      Heart sounds: Normal heart sounds. No murmur heard.     No gallop.   Pulmonary:      Breath sounds: Normal breath sounds.   Abdominal:      General: There is no distension.      Palpations: There is no mass.      Tenderness: There is no abdominal tenderness.   Musculoskeletal:         General: No swelling.      Cervical back: No rigidity.      Right lower leg: No edema.      Left lower leg: No edema.   Lymphadenopathy:      Cervical: No cervical adenopathy.   Skin:     General: Skin is warm.      Coloration: Skin is not cyanotic.      Findings: No bruising, ecchymosis or erythema.   Neurological:      General: No focal deficit present.      Mental Status: She is oriented to person, place, and time.      Cranial Nerves: Cranial nerves 2-12 are intact.      Motor: Motor function is intact.   Psychiatric:         Attention and Perception: Attention and perception normal.         Mood and Affect: Mood and affect normal.         Behavior: Behavior normal.         Thought Content: Thought content normal.         Judgment: Judgment normal.           Diagnostic Results   === 05/16/25 ===    CT CHEST ABDOMEN PELVIS W IV CONTRAST    - Impression -  Breast cancer restaging scan:  1. No significant interval change of right axillary tumor burden and  osseous metastatic disease. No new sites of metastatic disease within  the chest, abdomen or pelvis.  2. Similar chronic and ancillary findings as described above.    MACRO:  None    Signed by: Dez Ortiz 5/17/2025 3:11 PM  Dictation workstation:   [id number]    LABORATORY/PATHOLOGY DATA    Orders Only on 05/12/2025   Component Date Value Ref Range Status    CHOLESTEROL, TOTAL 05/12/2025 170  <200 mg/dL Final    HDL CHOLESTEROL 05/12/2025 44 (L)  > OR = 50 mg/dL Final    TRIGLYCERIDES 05/12/2025 187 (H)  <150 mg/dL Final    LDL-CHOLESTEROL 05/12/2025 98  mg/dL (calc) Final    CHOL/HDLC RATIO 05/12/2025 3.9  <5.0 (calc) Final    NON HDL CHOLESTEROL 05/12/2025 126  <130 mg/dL (calc) Final    URIC ACID 05/12/2025 7.3 (H)  2.5 - 7.0 mg/dL Final    GLUCOSE 05/12/2025 124 (H)  65 - 99 mg/dL Final    UREA NITROGEN (BUN) 05/12/2025 29 (H)  7 - 25 mg/dL Final    CREATININE 05/12/2025 1.08 (H)  0.60 - 1.00 mg/dL Final    EGFR 05/12/2025 54 (L)  > OR = 60 mL/min/1.73m2 Final    BUN/CREATININE RATIO 05/12/2025 27 (H)  6 - 22 (calc) Final    SODIUM 05/12/2025 137  135 - 146 mmol/L Final    POTASSIUM 05/12/2025 4.2  3.5 - 5.3 mmol/L Final    CHLORIDE 05/12/2025 101  98 - 110 mmol/L Final    CARBON DIOXIDE 05/12/2025 27  20 - 32 mmol/L Final    CALCIUM 05/12/2025 9.4  8.6 - 10.4 mg/dL Final    PROTEIN, TOTAL 05/12/2025 7.1  6.1 - 8.1 g/dL Final    ALBUMIN 05/12/2025 4.0  3.6 - 5.1 g/dL Final    GLOBULIN 05/12/2025 3.1  1.9 - 3.7 g/dL (calc) Final    ALBUMIN/GLOBULIN RATIO 05/12/2025 1.3  1.0 - 2.5 (calc) Final    BILIRUBIN, TOTAL 05/12/2025 0.5  0.2 - 1.2 mg/dL Final    ALKALINE PHOSPHATASE 05/12/2025 75  37 - 153 U/L Final    AST 05/12/2025 15  10 - 35 U/L Final    ALT 05/12/2025 10  6 - 29 U/L Final    WHITE BLOOD CELL COUNT 05/12/2025 3.2 (L)  3.8 - 10.8 Thousand/uL Final    RED BLOOD CELL COUNT 05/12/2025 3.37 (L)  3.80 - 5.10 Million/uL Final    HEMOGLOBIN 05/12/2025 12.3  11.7 - 15.5 g/dL Final    HEMATOCRIT 05/12/2025 36.8  35.0 - 45.0 % Final    MCV 05/12/2025 109.2 (H)  80.0 - 100.0 fL Final    MCH 05/12/2025 36.5 (H)  27.0 - 33.0 pg Final    MCHC 05/12/2025 33.4  32.0 - 36.0 g/dL Final    RDW 05/12/2025 14.4  11.0 - 15.0 % Final    PLATELET COUNT 05/12/2025 223  140 - 400  Thousand/uL Final    MPV 05/12/2025 9.3  7.5 - 12.5 fL Final    CA 27.29 05/12/2025 36  <38 U/mL Final    VITAMIN D,25-OH,TOTAL,IA 05/12/2025 63  30 - 100 ng/mL Final    TSH 05/12/2025 2.85  0.40 - 4.50 mIU/L Final    T4, FREE 05/12/2025 1.2  0.8 - 1.8 ng/dL Final    COLOR 05/12/2025 YELLOW  YELLOW Final    APPEARANCE 05/12/2025 CLEAR  CLEAR Final    SPECIFIC GRAVITY 05/12/2025 1.017  1.001 - 1.035 Final    PH 05/12/2025 5.5  5.0 - 8.0 Final    GLUCOSE 05/12/2025 NEGATIVE  NEGATIVE Final    BILIRUBIN 05/12/2025 NEGATIVE  NEGATIVE Final    KETONES 05/12/2025 NEGATIVE  NEGATIVE Final    OCCULT BLOOD 05/12/2025 NEGATIVE  NEGATIVE Final    PROTEIN 05/12/2025 NEGATIVE  NEGATIVE Final    NITRITE 05/12/2025 NEGATIVE  NEGATIVE Final    LEUKOCYTE ESTERASE 05/12/2025 2+ (A)  NEGATIVE Final    WBC 05/12/2025 0-5  < OR = 5 /HPF Final    RBC 05/12/2025 NONE SEEN  < OR = 2 /HPF Final    SQUAMOUS EPITHELIAL CELLS 05/12/2025 0-5  < OR = 5 /HPF Final    BACTERIA 05/12/2025 NONE SEEN  NONE SEEN /HPF Final    HYALINE CAST 05/12/2025 NONE SEEN  NONE SEEN /LPF Final    NOTE 05/12/2025    Final    CULTURE, URINE, ROUTINE 05/12/2025 SEE NOTE   Final      Lab Results   Component Value Date    LABCA2 36 05/12/2025    LABCA2 25.7 01/13/2025    LABCA2 33.0 10/31/2024    LABCA2 24.8 02/05/2024    LABCA2 34.0 11/10/2023             IMPRESSION/PLAN    Metastatic ER+/HER2- breast cancer. Continued response to therapy. Will continue present treatment. RTC in about 3 months for follow-up with Gabrielle and bloodwork. Continue zometa q3 months. CT scans will repeat every 6 months..    2. CKD. Monitor    We discussed the clinical significance of diagnosis, goals of care and treatment plan in detail.     I personally spent over half of a total 35 minutes face to face with the patient in counseling and discussion and/or coordination of care as described above.          -------------------------------------------------------------------------------------------------------------------------------  Jamar Betancourt MD  Director of Breast Cancer Medical Oncology Research Program   [facility]  Professor of Medicine  [place] [id number],  [address] [id number]  Phone: [phone]  [email or web address]

## 2025-05-22 ENCOUNTER — SPECIALTY PHARMACY (OUTPATIENT)
Dept: PHARMACY | Facility: CLINIC | Age: 75
End: 2025-05-22

## 2025-05-22 PROCEDURE — RXMED WILLOW AMBULATORY MEDICATION CHARGE

## 2025-05-29 ENCOUNTER — PHARMACY VISIT (OUTPATIENT)
Dept: PHARMACY | Facility: CLINIC | Age: 75
End: 2025-05-29
Payer: COMMERCIAL

## 2025-06-16 PROCEDURE — RXMED WILLOW AMBULATORY MEDICATION CHARGE

## 2025-06-20 ENCOUNTER — SPECIALTY PHARMACY (OUTPATIENT)
Dept: PHARMACY | Facility: CLINIC | Age: 75
End: 2025-06-20

## 2025-06-23 ENCOUNTER — PHARMACY VISIT (OUTPATIENT)
Dept: PHARMACY | Facility: CLINIC | Age: 75
End: 2025-06-23
Payer: COMMERCIAL

## 2025-06-24 ENCOUNTER — SPECIALTY PHARMACY (OUTPATIENT)
Dept: PHARMACY | Facility: CLINIC | Age: 75
End: 2025-06-24

## 2025-06-27 DIAGNOSIS — C79.51 CARCINOMA OF BREAST METASTATIC TO BONE, UNSPECIFIED LATERALITY: ICD-10-CM

## 2025-06-27 DIAGNOSIS — C50.919 CARCINOMA OF BREAST METASTATIC TO BONE, UNSPECIFIED LATERALITY: ICD-10-CM

## 2025-07-01 ENCOUNTER — TELEPHONE (OUTPATIENT)
Dept: PALLIATIVE MEDICINE | Facility: HOSPITAL | Age: 75
End: 2025-07-01
Payer: MEDICARE

## 2025-07-01 NOTE — TELEPHONE ENCOUNTER
Called patient as she is scheduled with Diana on 8/15 and she will not be in the office that day. Left  offering to move appt to 8/22 or could also do 8/18, 8/20 at [facility] if she prefers.

## 2025-07-11 ENCOUNTER — TELEPHONE (OUTPATIENT)
Dept: PALLIATIVE MEDICINE | Facility: HOSPITAL | Age: 75
End: 2025-07-11
Payer: MEDICARE

## 2025-07-11 NOTE — TELEPHONE ENCOUNTER
Called patient to reschedule her 8/15/25 appointment with Diana Farais. She agreed to 8/22/25 at 9:30, schedule updated

## 2025-07-14 ENCOUNTER — TELEPHONE (OUTPATIENT)
Dept: ADMISSION | Facility: HOSPITAL | Age: 75
End: 2025-07-14
Payer: MEDICARE

## 2025-07-14 DIAGNOSIS — G89.3 CANCER RELATED PAIN: ICD-10-CM

## 2025-07-14 RX ORDER — DULOXETIN HYDROCHLORIDE 60 MG/1
60 CAPSULE, DELAYED RELEASE ORAL NIGHTLY
Qty: 90 CAPSULE | Refills: 0 | Status: SHIPPED | OUTPATIENT
Start: 2025-07-14 | End: 2025-10-12

## 2025-07-14 RX ORDER — DULOXETIN HYDROCHLORIDE 30 MG/1
30 CAPSULE, DELAYED RELEASE ORAL NIGHTLY
Qty: 90 CAPSULE | Refills: 0 | Status: SHIPPED | OUTPATIENT
Start: 2025-07-14 | End: 2025-10-12

## 2025-07-14 NOTE — TELEPHONE ENCOUNTER
Patient last seen by DAVE Farias on 5/16 with plan to continue Duloxetine 90mg daily with dinner. Prescriptions pended to provider for approval.

## 2025-07-15 PROCEDURE — RXMED WILLOW AMBULATORY MEDICATION CHARGE

## 2025-07-18 ENCOUNTER — PHARMACY VISIT (OUTPATIENT)
Dept: PHARMACY | Facility: CLINIC | Age: 75
End: 2025-07-18
Payer: COMMERCIAL

## 2025-08-11 ENCOUNTER — LAB (OUTPATIENT)
Dept: LAB | Facility: CLINIC | Age: 75
End: 2025-08-11
Payer: MEDICARE

## 2025-08-11 DIAGNOSIS — C78.02 CARCINOMA OF LEFT BREAST METASTATIC TO LUNG: ICD-10-CM

## 2025-08-11 DIAGNOSIS — C50.912 CARCINOMA OF LEFT BREAST METASTATIC TO LUNG: ICD-10-CM

## 2025-08-11 LAB
ALBUMIN SERPL BCP-MCNC: 4.2 G/DL (ref 3.4–5)
ALP SERPL-CCNC: 75 U/L (ref 33–136)
ALT SERPL W P-5'-P-CCNC: 13 U/L (ref 7–45)
ANION GAP SERPL CALC-SCNC: 11 MMOL/L (ref 10–20)
AST SERPL W P-5'-P-CCNC: 17 U/L (ref 9–39)
BASOPHILS # BLD AUTO: 0.02 X10*3/UL (ref 0–0.1)
BASOPHILS NFR BLD AUTO: 0.8 %
BILIRUB SERPL-MCNC: 0.5 MG/DL (ref 0–1.2)
BUN SERPL-MCNC: 27 MG/DL (ref 6–23)
CALCIUM SERPL-MCNC: 9 MG/DL (ref 8.6–10.6)
CANCER AG27-29 SERPL-ACNC: 38.3 U/ML (ref 0–38.6)
CHLORIDE SERPL-SCNC: 97 MMOL/L (ref 98–107)
CO2 SERPL-SCNC: 30 MMOL/L (ref 21–32)
CREAT SERPL-MCNC: 1.05 MG/DL (ref 0.5–1.05)
EGFRCR SERPLBLD CKD-EPI 2021: 56 ML/MIN/1.73M*2
EOSINOPHIL # BLD AUTO: 0.07 X10*3/UL (ref 0–0.4)
EOSINOPHIL NFR BLD AUTO: 2.8 %
ERYTHROCYTE [DISTWIDTH] IN BLOOD BY AUTOMATED COUNT: 13.7 % (ref 11.5–14.5)
GLUCOSE SERPL-MCNC: 100 MG/DL (ref 74–99)
HCT VFR BLD AUTO: 38 % (ref 36–46)
HGB BLD-MCNC: 12.7 G/DL (ref 12–16)
IMM GRANULOCYTES # BLD AUTO: 0 X10*3/UL (ref 0–0.5)
IMM GRANULOCYTES NFR BLD AUTO: 0 % (ref 0–0.9)
LYMPHOCYTES # BLD AUTO: 0.85 X10*3/UL (ref 0.8–3)
LYMPHOCYTES NFR BLD AUTO: 33.5 %
MCH RBC QN AUTO: 36.6 PG (ref 26–34)
MCHC RBC AUTO-ENTMCNC: 33.4 G/DL (ref 32–36)
MCV RBC AUTO: 110 FL (ref 80–100)
MONOCYTES # BLD AUTO: 0.24 X10*3/UL (ref 0.05–0.8)
MONOCYTES NFR BLD AUTO: 9.4 %
NEUTROPHILS # BLD AUTO: 1.36 X10*3/UL (ref 1.6–5.5)
NEUTROPHILS NFR BLD AUTO: 53.5 %
NRBC BLD-RTO: ABNORMAL /100{WBCS}
PLATELET # BLD AUTO: 152 X10*3/UL (ref 150–450)
POTASSIUM SERPL-SCNC: 4.3 MMOL/L (ref 3.5–5.3)
PROT SERPL-MCNC: 7.1 G/DL (ref 6.4–8.2)
RBC # BLD AUTO: 3.47 X10*6/UL (ref 4–5.2)
RBC MORPH BLD: NORMAL
SODIUM SERPL-SCNC: 134 MMOL/L (ref 136–145)
WBC # BLD AUTO: 2.5 X10*3/UL (ref 4.4–11.3)

## 2025-08-11 PROCEDURE — 80053 COMPREHEN METABOLIC PANEL: CPT

## 2025-08-11 PROCEDURE — 86300 IMMUNOASSAY TUMOR CA 15-3: CPT

## 2025-08-11 PROCEDURE — 85025 COMPLETE CBC W/AUTO DIFF WBC: CPT

## 2025-08-11 PROCEDURE — 36415 COLL VENOUS BLD VENIPUNCTURE: CPT

## 2025-08-13 ENCOUNTER — SPECIALTY PHARMACY (OUTPATIENT)
Dept: PHARMACY | Facility: CLINIC | Age: 75
End: 2025-08-13

## 2025-08-13 PROCEDURE — RXMED WILLOW AMBULATORY MEDICATION CHARGE

## 2025-08-15 ENCOUNTER — APPOINTMENT (OUTPATIENT)
Dept: PALLIATIVE MEDICINE | Facility: CLINIC | Age: 75
End: 2025-08-15
Payer: MEDICARE

## 2025-08-15 ENCOUNTER — PHARMACY VISIT (OUTPATIENT)
Dept: PHARMACY | Facility: CLINIC | Age: 75
End: 2025-08-15
Payer: COMMERCIAL

## 2025-08-18 ENCOUNTER — TELEPHONE (OUTPATIENT)
Dept: ADMISSION | Facility: HOSPITAL | Age: 75
End: 2025-08-18
Payer: MEDICARE

## 2025-08-18 DIAGNOSIS — G89.3 CANCER RELATED PAIN: ICD-10-CM

## 2025-08-18 DIAGNOSIS — Z51.5 PALLIATIVE CARE ENCOUNTER: ICD-10-CM

## 2025-08-18 RX ORDER — PREGABALIN 75 MG/1
75 CAPSULE ORAL 3 TIMES DAILY
Qty: 270 CAPSULE | Refills: 0 | Status: SHIPPED | OUTPATIENT
Start: 2025-08-18 | End: 2025-11-16

## 2025-08-20 ENCOUNTER — ONCOLOGY MEDICATION OUTREACH (OUTPATIENT)
Dept: HEMATOLOGY/ONCOLOGY | Facility: CLINIC | Age: 75
End: 2025-08-20

## 2025-08-20 ENCOUNTER — INFUSION (OUTPATIENT)
Dept: HEMATOLOGY/ONCOLOGY | Facility: CLINIC | Age: 75
End: 2025-08-20
Payer: MEDICARE

## 2025-08-20 ENCOUNTER — APPOINTMENT (OUTPATIENT)
Dept: HEMATOLOGY/ONCOLOGY | Facility: CLINIC | Age: 75
End: 2025-08-20
Payer: MEDICARE

## 2025-08-20 ENCOUNTER — OFFICE VISIT (OUTPATIENT)
Dept: HEMATOLOGY/ONCOLOGY | Facility: CLINIC | Age: 75
End: 2025-08-20
Payer: MEDICARE

## 2025-08-20 VITALS
BODY MASS INDEX: 27.98 KG/M2 | DIASTOLIC BLOOD PRESSURE: 59 MMHG | SYSTOLIC BLOOD PRESSURE: 130 MMHG | OXYGEN SATURATION: 97 % | TEMPERATURE: 97.2 F | HEART RATE: 66 BPM | WEIGHT: 181 LBS

## 2025-08-20 DIAGNOSIS — Z78.0 MENOPAUSE: ICD-10-CM

## 2025-08-20 DIAGNOSIS — M89.8X9 BONE PAIN: ICD-10-CM

## 2025-08-20 DIAGNOSIS — Z51.81 ENCOUNTER FOR MONITORING BISPHOSPHONATE THERAPY: ICD-10-CM

## 2025-08-20 DIAGNOSIS — M25.552 LEFT HIP PAIN: ICD-10-CM

## 2025-08-20 DIAGNOSIS — C78.02 CARCINOMA OF LEFT BREAST METASTATIC TO LUNG: ICD-10-CM

## 2025-08-20 DIAGNOSIS — C50.919 MALIGNANT NEOPLASM OF BREAST IN FEMALE, ESTROGEN RECEPTOR POSITIVE, UNSPECIFIED LATERALITY, UNSPECIFIED SITE OF BREAST: ICD-10-CM

## 2025-08-20 DIAGNOSIS — Z17.0 MALIGNANT NEOPLASM OF BREAST IN FEMALE, ESTROGEN RECEPTOR POSITIVE, UNSPECIFIED LATERALITY, UNSPECIFIED SITE OF BREAST: ICD-10-CM

## 2025-08-20 DIAGNOSIS — Z51.11 ENCOUNTER FOR ANTINEOPLASTIC CHEMOTHERAPY: ICD-10-CM

## 2025-08-20 DIAGNOSIS — C50.912 CARCINOMA OF LEFT BREAST METASTATIC TO LUNG: ICD-10-CM

## 2025-08-20 DIAGNOSIS — Z79.83 ENCOUNTER FOR MONITORING BISPHOSPHONATE THERAPY: ICD-10-CM

## 2025-08-20 DIAGNOSIS — R10.2 PAIN IN PELVIS: ICD-10-CM

## 2025-08-20 DIAGNOSIS — Z79.811 AROMATASE INHIBITOR USE: ICD-10-CM

## 2025-08-20 DIAGNOSIS — C50.919 CARCINOMA OF BREAST METASTATIC TO BONE, UNSPECIFIED LATERALITY: ICD-10-CM

## 2025-08-20 DIAGNOSIS — C79.51 CARCINOMA OF BREAST METASTATIC TO BONE, UNSPECIFIED LATERALITY: ICD-10-CM

## 2025-08-20 PROCEDURE — 1159F MED LIST DOCD IN RCRD: CPT | Performed by: NURSE PRACTITIONER

## 2025-08-20 PROCEDURE — 99215 OFFICE O/P EST HI 40 MIN: CPT | Performed by: NURSE PRACTITIONER

## 2025-08-20 PROCEDURE — 1160F RVW MEDS BY RX/DR IN RCRD: CPT | Performed by: NURSE PRACTITIONER

## 2025-08-20 PROCEDURE — 1126F AMNT PAIN NOTED NONE PRSNT: CPT | Performed by: NURSE PRACTITIONER

## 2025-08-20 PROCEDURE — 96365 THER/PROPH/DIAG IV INF INIT: CPT | Mod: INF

## 2025-08-20 PROCEDURE — 2500000004 HC RX 250 GENERAL PHARMACY W/ HCPCS (ALT 636 FOR OP/ED): Performed by: INTERNAL MEDICINE

## 2025-08-20 PROCEDURE — 1036F TOBACCO NON-USER: CPT | Performed by: NURSE PRACTITIONER

## 2025-08-20 RX ORDER — ALBUTEROL SULFATE 0.83 MG/ML
3 SOLUTION RESPIRATORY (INHALATION) AS NEEDED
OUTPATIENT
Start: 2025-09-24

## 2025-08-20 RX ORDER — EPINEPHRINE 0.3 MG/.3ML
0.3 INJECTION SUBCUTANEOUS EVERY 5 MIN PRN
Status: DISCONTINUED | OUTPATIENT
Start: 2025-08-20 | End: 2025-08-20 | Stop reason: HOSPADM

## 2025-08-20 RX ORDER — DIPHENHYDRAMINE HYDROCHLORIDE 50 MG/ML
50 INJECTION, SOLUTION INTRAMUSCULAR; INTRAVENOUS AS NEEDED
OUTPATIENT
Start: 2025-09-24

## 2025-08-20 RX ORDER — EPINEPHRINE 0.3 MG/.3ML
0.3 INJECTION SUBCUTANEOUS EVERY 5 MIN PRN
OUTPATIENT
Start: 2025-09-24

## 2025-08-20 RX ORDER — ZOLEDRONIC ACID 0.04 MG/ML
4 INJECTION, SOLUTION INTRAVENOUS ONCE
OUTPATIENT
Start: 2025-09-24

## 2025-08-20 RX ORDER — ZOLEDRONIC ACID 0.04 MG/ML
4 INJECTION, SOLUTION INTRAVENOUS ONCE
Status: COMPLETED | OUTPATIENT
Start: 2025-08-20 | End: 2025-08-20

## 2025-08-20 RX ORDER — ALBUTEROL SULFATE 0.83 MG/ML
3 SOLUTION RESPIRATORY (INHALATION) AS NEEDED
Status: DISCONTINUED | OUTPATIENT
Start: 2025-08-20 | End: 2025-08-20 | Stop reason: HOSPADM

## 2025-08-20 RX ORDER — DIPHENHYDRAMINE HYDROCHLORIDE 50 MG/ML
50 INJECTION, SOLUTION INTRAMUSCULAR; INTRAVENOUS AS NEEDED
Status: DISCONTINUED | OUTPATIENT
Start: 2025-08-20 | End: 2025-08-20 | Stop reason: HOSPADM

## 2025-08-20 RX ORDER — FAMOTIDINE 10 MG/ML
20 INJECTION, SOLUTION INTRAVENOUS ONCE AS NEEDED
Status: DISCONTINUED | OUTPATIENT
Start: 2025-08-20 | End: 2025-08-20 | Stop reason: HOSPADM

## 2025-08-20 RX ORDER — FAMOTIDINE 10 MG/ML
20 INJECTION, SOLUTION INTRAVENOUS ONCE AS NEEDED
OUTPATIENT
Start: 2025-09-24

## 2025-08-20 RX ADMIN — ZOLEDRONIC ACID 4 MG: 0.04 INJECTION, SOLUTION INTRAVENOUS at 14:51

## 2025-08-20 ASSESSMENT — PATIENT HEALTH QUESTIONNAIRE - PHQ9
1. LITTLE INTEREST OR PLEASURE IN DOING THINGS: NOT AT ALL
SUM OF ALL RESPONSES TO PHQ9 QUESTIONS 1 & 2: 0
2. FEELING DOWN, DEPRESSED OR HOPELESS: NOT AT ALL

## 2025-08-20 ASSESSMENT — PAIN SCALES - GENERAL: PAINLEVEL_OUTOF10: 0-NO PAIN

## 2025-08-22 ENCOUNTER — APPOINTMENT (OUTPATIENT)
Dept: PALLIATIVE MEDICINE | Facility: CLINIC | Age: 75
End: 2025-08-22
Payer: MEDICARE

## 2025-08-27 ENCOUNTER — HOSPITAL ENCOUNTER (OUTPATIENT)
Dept: RADIOLOGY | Facility: HOSPITAL | Age: 75
Discharge: HOME | End: 2025-08-27
Payer: MEDICARE

## 2025-08-27 DIAGNOSIS — C79.51 CARCINOMA OF BREAST METASTATIC TO BONE, UNSPECIFIED LATERALITY: ICD-10-CM

## 2025-08-27 DIAGNOSIS — Z51.11 ENCOUNTER FOR ANTINEOPLASTIC CHEMOTHERAPY: ICD-10-CM

## 2025-08-27 DIAGNOSIS — Z79.83 ENCOUNTER FOR MONITORING BISPHOSPHONATE THERAPY: ICD-10-CM

## 2025-08-27 DIAGNOSIS — M89.8X9 BONE PAIN: ICD-10-CM

## 2025-08-27 DIAGNOSIS — R10.2 PAIN IN PELVIS: ICD-10-CM

## 2025-08-27 DIAGNOSIS — M25.552 LEFT HIP PAIN: ICD-10-CM

## 2025-08-27 DIAGNOSIS — C78.02 CARCINOMA OF LEFT BREAST METASTATIC TO LUNG: ICD-10-CM

## 2025-08-27 DIAGNOSIS — C50.912 CARCINOMA OF LEFT BREAST METASTATIC TO LUNG: ICD-10-CM

## 2025-08-27 DIAGNOSIS — C50.919 CARCINOMA OF BREAST METASTATIC TO BONE, UNSPECIFIED LATERALITY: ICD-10-CM

## 2025-08-27 DIAGNOSIS — Z51.81 ENCOUNTER FOR MONITORING BISPHOSPHONATE THERAPY: ICD-10-CM

## 2025-08-27 DIAGNOSIS — Z79.811 AROMATASE INHIBITOR USE: ICD-10-CM

## 2025-08-27 PROCEDURE — 2550000001 HC RX 255 CONTRASTS: Performed by: NURSE PRACTITIONER

## 2025-08-27 PROCEDURE — A9575 INJ GADOTERATE MEGLUMI 0.1ML: HCPCS | Performed by: NURSE PRACTITIONER

## 2025-08-27 PROCEDURE — 73723 MRI JOINT LWR EXTR W/O&W/DYE: CPT | Mod: LT

## 2025-08-27 RX ORDER — GADOTERATE MEGLUMINE 376.9 MG/ML
17 INJECTION INTRAVENOUS
Status: COMPLETED | OUTPATIENT
Start: 2025-08-27 | End: 2025-08-27

## 2025-08-27 RX ADMIN — GADOTERATE MEGLUMINE 17 ML: 376.9 INJECTION INTRAVENOUS at 20:44

## 2025-08-28 ENCOUNTER — RESULTS FOLLOW-UP (OUTPATIENT)
Dept: HEMATOLOGY/ONCOLOGY | Facility: HOSPITAL | Age: 75
End: 2025-08-28
Payer: MEDICARE

## 2025-08-29 ENCOUNTER — OFFICE VISIT (OUTPATIENT)
Dept: PALLIATIVE MEDICINE | Facility: CLINIC | Age: 75
End: 2025-08-29
Payer: MEDICARE

## 2025-08-29 VITALS
HEART RATE: 66 BPM | SYSTOLIC BLOOD PRESSURE: 109 MMHG | RESPIRATION RATE: 18 BRPM | OXYGEN SATURATION: 97 % | BODY MASS INDEX: 27.78 KG/M2 | WEIGHT: 179.7 LBS | TEMPERATURE: 96.8 F | DIASTOLIC BLOOD PRESSURE: 60 MMHG

## 2025-08-29 DIAGNOSIS — G62.0 PERIPHERAL NEUROPATHY DUE TO CHEMOTHERAPY (MULTI): ICD-10-CM

## 2025-08-29 DIAGNOSIS — Z51.5 PALLIATIVE CARE ENCOUNTER: Primary | ICD-10-CM

## 2025-08-29 DIAGNOSIS — C50.919 CARCINOMA OF BREAST METASTATIC TO BONE, UNSPECIFIED LATERALITY: ICD-10-CM

## 2025-08-29 DIAGNOSIS — G89.3 CANCER RELATED PAIN: ICD-10-CM

## 2025-08-29 DIAGNOSIS — T45.1X5A PERIPHERAL NEUROPATHY DUE TO CHEMOTHERAPY (MULTI): ICD-10-CM

## 2025-08-29 DIAGNOSIS — C79.51 CARCINOMA OF BREAST METASTATIC TO BONE, UNSPECIFIED LATERALITY: ICD-10-CM

## 2025-08-29 PROCEDURE — 1036F TOBACCO NON-USER: CPT

## 2025-08-29 PROCEDURE — 1126F AMNT PAIN NOTED NONE PRSNT: CPT

## 2025-08-29 PROCEDURE — 99214 OFFICE O/P EST MOD 30 MIN: CPT

## 2025-08-29 PROCEDURE — 1159F MED LIST DOCD IN RCRD: CPT

## 2025-08-29 ASSESSMENT — PAIN SCALES - GENERAL: PAINLEVEL_OUTOF10: 0-NO PAIN

## 2025-09-05 ENCOUNTER — APPOINTMENT (OUTPATIENT)
Dept: PALLIATIVE MEDICINE | Facility: CLINIC | Age: 75
End: 2025-09-05
Payer: MEDICARE